# Patient Record
Sex: MALE | Race: WHITE | Employment: FULL TIME | URBAN - METROPOLITAN AREA
[De-identification: names, ages, dates, MRNs, and addresses within clinical notes are randomized per-mention and may not be internally consistent; named-entity substitution may affect disease eponyms.]

---

## 2018-07-31 ENCOUNTER — HOSPITAL ENCOUNTER (OUTPATIENT)
Dept: PHYSICAL THERAPY | Age: 48
Discharge: HOME | End: 2018-07-31
Payer: COMMERCIAL

## 2018-07-31 DIAGNOSIS — M54.5 LOW BACK PAIN, UNSPECIFIED BACK PAIN LATERALITY, UNSPECIFIED CHRONICITY, WITH SCIATICA PRESENCE UNSPECIFIED: ICD-10-CM

## 2018-07-31 DIAGNOSIS — M54.6 THORACIC BACK PAIN, UNSPECIFIED BACK PAIN LATERALITY, UNSPECIFIED CHRONICITY: ICD-10-CM

## 2018-07-31 PROCEDURE — 97110 THERAPEUTIC EXERCISES: CPT

## 2018-07-31 PROCEDURE — 97161 PT EVAL LOW COMPLEX 20 MIN: CPT

## 2018-07-31 NOTE — THERAPY EVALUATION
Baron Daley  : 1970  Primary: [employer]*  Secondary:  [address]  at [address], [address]  [phone]   [id number]       OUTPATIENT PHYSICAL THERAPY:Initial Assessment and Daily Note 2018   ICD-10: Treatment Diagnosis: Low back Pain (M54.4); sprain of ligaments of the lumbar spine, sequela (S34.21XS)  Precautions/Allergies:   Review of patient's allergies indicates no known allergies. Fall Risk Score: 1 (? 5 = High Risk)  MD Orders: Eval and treat MEDICAL/REFERRING DIAGNOSIS:  Low back pain, unspecified back pain laterality, unspecified chronicity, with sciatica presence unspecified [M54.5]  Thoracic back pain, unspecified back pain laterality, unspecified chronicity [M54.6]   DATE OF ONSET: 2018  REFERRING PHYSICIAN: Catrachito Fernández MD  RETURN PHYSICIAN APPOINTMENT: As needed     INITIAL ASSESSMENT:  Mr. Zapata presents motion dysfunction with concurrent myofascial restrictions through lower lumbar spine with postural dysfunction and alignment issue through spine with associated lack of spinal stability, specifically with back extensors and gluteals limiting ability to perform recreational activity and ADLs without episodic onset of back symptoms    PROBLEM LIST (Impacting functional limitations):   Increased pain through low back limiting tolerance of recreational activity (tennis/working out)  Decreased activity tolerance for instrumental ADLs  Decreased ADL/functional activities specificially with bending/lifting/twisting due to episodic LBP  Outcome measure score of 17/50 on Modified Oswestry with moderate effect of low back on patient's ability to manage every day life activities  Decreased strength through back stabilizers limiting tolerance with flexion activities and inefficient postural alignment  Decreased flexibility/mobility/ROM through hip flexors limiting spinal mobility INTERVENTIONS PLANNED:  Patient education including pathophysiology of low back pain  Back education & training (sleeping, sitting and standing positions, posture re-education and body mechanics)  ROM/mobility of hip flexors  Manual therapy including soft tissue mobilizations, functional joint mobilizations and neuromuscular re-education to lumbopelvic region  Neuromuscular re-education with focus on initiation/strength and endurance of back extensors and gluts and motor control of spinal stabilizers with dynamic flexion and rotation activities  Therapeutic exercises including spinal stabilization and hip flexibility  Instructions of home exercise program (HEP)   TREATMENT PLAN:  Effective Dates: 7/31/2018 TO 10/23/18. Frequency/Duration: 1 time a week for 4 weeks followed by 1 time a month for 8 weeks  GOALS: (Goals have been discussed and agreed upon with patient.)  Short-Term Functional Goals: Time Frame: 2 weeks  1. Patient independent with HEP and improvement with static alignment and hip flexor flexibility present  Discharge Goals: Time Frame: 12 weeks  1. Patient independent with a functional spinal stabilization program with no recurrence of LBP. 2. Patient able to return to full recreational activity without recurrence of LBP  3. Outcome measure score of less than 8/50 on Modified Oswestry with very minimal effect of low back on patient's ability to manage every day life activities  Rehabilitation Potential For Stated Goals: Excellent            The information in this section was collected on 7/31/2018 (except where otherwise noted). HISTORY:   History of Present Injury/Illness (Reason for Referral):  Patient reports episodic back pain since initial back injury over 20 years ago playing basketball. He reports recurrence every 1-2 x/year and will necessitate him seeing his PCP for Tordol and Medrol dose pack to treat inflammation and will return to asymptomatic after a few weeks.  Reports the injuries that occur to onset his back pain are often very simple flexion/rotation tasks and at the end of a tennis game. Saw PCP for back problem and recommended physical therapy to conservatively manage symptoms. Goals for therapy include relief of back pain and tools/knowledge to fix the problem rather than treat the symptoms  Past Medical History/Comorbidities:   Mr. Zapata  has a past medical history of Allergic rhinitis; Benign neoplasm of colon; Impotence of organic origin; Mixed hyperlipidemia; testicular hypofunction; Reflux esophagitis; and Vitamin D deficiency. Mr. Zapata  has a past surgical history that includes hx vasectomy (2011) and hx shoulder arthroscopy (Right, 09/2006). Social History/Living Environment:     Patient lives with his family. Patient denies and physical barriers at home. Prior Level of Function/Work/Activity:  Patient works full time as a  with minimal work requirements including prolonged sitting. Patient reports a high physical activity level exercising at [employer] for 60 minutes 5-6x/week. Dominant Side:         RIGHT  Current Medications:       Current Outpatient Prescriptions:     methylPREDNISolone (MEDROL, NATHALIE,) 4 mg tablet, Take 1 Tab by mouth Specific Days and Specific Times. , Disp: 1 Dose Pack, Rfl: 0    omeprazole (PRILOSEC) 40 mg capsule, Take 1 Cap by mouth daily. , Disp: 30 Cap, Rfl: 5    fexofenadine (ALLEGRA) 180 mg tablet, Take  by mouth., Disp: , Rfl:     fluticasone (FLONASE) 50 mcg/actuation nasal spray, 1 [place] by Both Nostrils route daily. , Disp: 1 Bottle, Rfl: 5    ranitidine (ZANTAC) 150 mg tablet, Take 150 mg by mouth two (2) times a day.  Patient instructed to take morning of surgery per anesthesia guidelines, Disp: , Rfl:    Date Last Reviewed:  7/31/2018   Number of Personal Factors/Comorbidities that affect the Plan of Care: 0: LOW COMPLEXITY   EXAMINATION:   Observation/Orthostatic Postural Assessment: Patient exhibits an accentuated lumbar lordosis and thoracic kyphosis with a type I curve convex R with a right rotated position of ribcage and upper lumbar spine. Costal cage is positioned posterior and rotated anterior  over a anterior pelvic inclination. Lower extremity weight bearing is symmetrical with accentuated B LE external rotation in standing. Shoulder symmetry exhibits R depressed and asymmetry of R latt to L. Observation of gait indicates no deficits (patient is a leg walker versus an efficient trunk walker). Lower quadrant bony landmarks are symmtrical. Vertical compression test (VCT) for alignment is 3/5 and anterior shear and L side bending and lower lumbar region concurring with Type I curve noted above L5. Soft tissue observation indicates atrophy through B gluteals and lower lumbar region with constrictions present in same area. .  Palpation: Myofascial assessment indicates increased tone through B hip flexors, worse on the R. Muscle length testing in modified Khalif position exhibits -30 R iliopsoas from neutral and -10 on L; rectus femoris is 60 on L and 80 on R; tensor fascia tono is -30 on R and -10 on L. Hamstring length tested supine with straight leg raise (SLR) is 70 degrees and WNL. Piriformis length is minimally tight B. Quadriceps flexibility tested prone with heel to buttock is to buttock. Marshell Care test is (+) on L  for rectus femoris tightness. Gastrocnemius and soleus flexibility are WNL. ROM: Passive trunk rotation is 100% available to the R and 75% available to the L with a hard end feel at lower lumbar. Kinetic testing in standing including forward bend test is (-). Marcher's test is (-). Leg swing for innominate mobility indicates decreased extension B, worse on the R. Pelvic shear test is standing exhibits decreased excursion of R shear with a hard end feel. Single plane active movements through lumbar spine in standing exhibit 50% loss of flexion with ratio of spine:hamstrings 30:70. . Passive ROM through lumbopelvic flexion is hypomobile through hip, innominate and sacrum B. Lumbar positional testing at transverse processes indicates minor motion loss of B flexion at L5 and L4. Sacral positional testing indicates symmetrical. Seated forward flexion test is (-). Prone knee active flexion test is (-). Spring testing of lumbar spine exhibits hardest end feels at L2/1. Spring testing of sacrum exhibits hard rico feels at B bases. Spring testing of innominates exhibits hardest endfeel R extension. AROM (PROM) (* - denotes pain) Right Left   Hip flexion/Innominate flexion 100/110 100/110   Hip extension/Innominate extension -10/-10 -10/-5   Hip external rotation (ER) 40 40   Hip internal rotation (IR) 35 35   Hip abduction 30 30   Hip adduction 20 25   Strength: Lumbar protective mechanism (LPM) & Automatic Core Engagement (ACE):   Strength: */5; Engagement scored as present/absent/sluggish R diagonal L diagonal   LPM - Flexion Present; 5 Present; 5   LPM - Extension Present;; 1 (immediate R rotation noted) Present; 3     Manual Muscle Test (*/5) Right Left   Knee extension 5 5   Knee flexion 5 5   Hip flexion 5 5   Hip ER 5 5   Hip IR 4+ 4+   Hip extension 4+ 4+   Hip abduction 4+ 4+   Hip adduction 5 5   Ankle DF 5 5   Ankle PF 5 5   Special Tests:     Functional squat (LE clearance): WNL   Soo test: (+) R 6\" from Bed  [place] test is (-) B  Neurological Screen:  Myotomes: Key muscle strength testing through bilateral LE is good. Dermatomes: Sensation testing through bilateral lower quadrants for light touch is intact. Reflexes: L3/4-Patellar; L5-Extensor digitorum brevis; S1-Achilles - 2+ and WNL. Neural tension tests: Passive straight leg raise(SLR)/Lasègues test - (-); crossed SLR test - (-)  Functional Mobility:  Independent   Balance: Age appropriate for sitting and standing both statically and dynamically. Mental Status: Alert and oriented to person, time and place. Body Structures Involved:  1.  Thoracic Cage  2. Joints  3. Muscles  4. Ligaments Body Functions Affected:  1. Sensory/Pain  2. Neuromusculoskeletal  3. Movement Related Activities and Participation Affected:  1. Mobility  2. Self Care  3. Community, Social and [place]   Number of elements (examined above) that affect the Plan of Care: 4+: HIGH COMPLEXITY   CLINICAL PRESENTATION:   Presentation: Stable and uncomplicated: LOW COMPLEXITY   CLINICAL DECISION MAKING:   Outcome Measure: Tool Used: Modified Oswestry Low Back Pain Questionnaire  Score:  Initial: 17/50 (07/31/18)  Most Recent: X/50 (Date: -- )   Interpretation of Score: Each section is scored on a 0-5 scale, 5 representing the greatest disability. The scores of each section are added together for a total score of 50. Medical Necessity:   · Patient is expected to demonstrate progress in strength, range of motion and spinal stability to return to sport and ADLs without recurrence of LBP. Reason for Services/Other Comments:  · Patient continues to require skilled intervention due to  motion dysfunction with concurrent myofascial restrictions through lower lumbar spine with postural dysfunction and alignment issue through spine with associated lack of spinal stability, specifically with back extensors and gluteals limiting ability to perform recreational activity and ADLs without episodic onset of back symptoms. Use of outcome tool(s) and clinical judgement create a POC that gives a: Clear prediction of patient's progress: LOW COMPLEXITY        TREATMENT:   (In addition to Assessment/Re-Assessment sessions the following treatments were rendered)  Pre-treatment Symptoms/Complaints: He reports central LBP, more on the L side. He reports since Tordol injection and steroid pack that concluded 1 1/2 weeks ago, his symptoms are mostly tightness in his low back and he has not resumed sport/gym due to fear of re injuring his back. Patient denies any LE pain, paresthesia or symptoms.  Patient denies any increase of symptoms with cough, sneeze or valsalva. Patient denies any saddle paresthesia or bowel/bladder deficits. Pain: Initial:   Pain Intensity 1: 6  Pain Location 1: Spine, lumbar  Pain Orientation 1: Left, Right  Pain Intervention(s) 1: Medication (see MAR), Rest  Post Session:  0/10   Therapeutic Exercise: (15 Minutes):  Exercises per grid below to improve strength, endurance, range of motion, flexibility. Required minimal visual and verbal cues to promote proper body alignment. Date:  07/31/18 Date:     Exercise     1/2 kneeling iliopsoas strength on R to increase spinal mobility 5 min    Kasi chair to improve gluteal strengthening/stability of spine 5 min    Quadriped spinal extensor stability with LE/UE elongation to improve spinal stability 5 min    Home Exercise Program (HEP): as above; handouts given to patient for all exercises. Treatment/Session Assessment:    · Response to Treatment:  Verbalized understanding and returned demo to all exercises. · Compliance with Program/Exercises: Will assess as treatment progresses.   · Recommendations/Intent for next treatment session: Progress mobility lower lumbar spine and pelvis and spinal stability and re-introduce to gym program  Total Treatment Duration:  PT Patient Time In/Time Out  Time In: 1515  Time Out: [address] [facility], PT

## 2018-07-31 NOTE — PROGRESS NOTES
Sabi Morelos  : 1970 [facility] at [facility]  [address] [place] [address]  [phone]   [phone]   OUTPATIENT ORTHOPAEDIC PHYSICAL THERAPY    NAME/AGE/GENDER: Sabi Morelos is a 53 y.o. male. DATE: 2018                         Ambulatory/Rehab Services H2 Model Falls Risk Assessment    Risk Factor Pts. ·   Confusion/Disorientation/Impulsivity  []      4 ·   Symptomatic Depression  []     2 ·   Altered Elimination  []     1 ·   Dizziness/Vertigo  []     1 ·   Gender (Male)  [x]     1 ·   Any administered antiepileptics (anticonvulsants):  []     2 ·   Any administered benzodiazepines:  []     1 ·   Visual Impairment (specify):  []     1 ·   Portable Oxygen Use  []     1 ·   Orthostatic ? BP  []     1 ·   History of Recent Falls (within 3 mos.)  []     5     Ability to Rise from Chair (choose one) Pts. ·   Ability to rise in a single movement  [x]     0 ·   Pushes up, successful in one attempt  []     1 ·   Multiple attempts, but unsuccessful  []     3 ·   Unable to rise without assistance  []     4   Total: (5 or greater = High Risk) 1     Falls Prevention Plan:   []                  Physical Limitations to Exercise (specify):   []                  Mobility Assistance Device (type):   []                  Exercise/Equipment Adaptation (specify):    © [employer] [address] [place] [address] Patent #[id number].  Federal Law prohibits the replication, distribution or use without written permission from [employer], [employer], Madhav Owusu CFMT

## 2018-08-03 ENCOUNTER — HOSPITAL ENCOUNTER (OUTPATIENT)
Dept: CT IMAGING | Age: 48
Discharge: HOME | End: 2018-08-03
Attending: FAMILY MEDICINE
Payer: COMMERCIAL

## 2018-08-03 DIAGNOSIS — R10.11 RUQ ABDOMINAL PAIN: ICD-10-CM

## 2018-08-03 PROCEDURE — 74011636320 HC RX REV CODE- 636/320: Performed by: FAMILY MEDICINE

## 2018-08-03 PROCEDURE — 74011000258 HC RX REV CODE- 258: Performed by: FAMILY MEDICINE

## 2018-08-03 PROCEDURE — 74177 CT ABD & PELVIS W/CONTRAST: CPT

## 2018-08-03 RX ORDER — SODIUM CHLORIDE 0.9 % (FLUSH) 0.9 %
10 SYRINGE (ML) INJECTION
Status: COMPLETED | OUTPATIENT
Start: 2018-08-03 | End: 2018-08-03

## 2018-08-03 RX ADMIN — IOPAMIDOL 100 ML: 755 INJECTION, SOLUTION INTRAVENOUS at 14:15

## 2018-08-03 RX ADMIN — DIATRIZOATE MEGLUMINE AND DIATRIZOATE SODIUM 15 ML: 660; 100 LIQUID ORAL; RECTAL at 14:15

## 2018-08-03 RX ADMIN — Medication 10 ML: at 14:15

## 2018-08-03 RX ADMIN — SODIUM CHLORIDE 100 ML: 900 INJECTION, SOLUTION INTRAVENOUS at 14:15

## 2018-08-08 ENCOUNTER — HOSPITAL ENCOUNTER (OUTPATIENT)
Dept: PHYSICAL THERAPY | Age: 48
Discharge: HOME | End: 2018-08-08
Payer: COMMERCIAL

## 2018-08-08 PROCEDURE — 97110 THERAPEUTIC EXERCISES: CPT

## 2018-08-08 NOTE — PROGRESS NOTES
[place]  : 1970  Primary: [employer]  Secondary:  [address] at [address]  [phone]   [phone]       OUTPATIENT PHYSICAL THERAPY:Daily Note 2018   ICD-10: Treatment Diagnosis: Low back Pain (M54.4); sprain of ligaments of the lumbar spine, sequela (S34.21XS)  Precautions/Allergies:   Review of patient's allergies indicates no known allergies. Fall Risk Score: 1 (? 5 = High Risk)  MD Orders: Eval and treat MEDICAL/REFERRING DIAGNOSIS:  Low back pain [M54.5]   DATE OF ONSET: 2018  REFERRING PHYSICIAN: Enrico Carreno MD  RETURN PHYSICIAN APPOINTMENT: As needed     INITIAL ASSESSMENT:  Mr. Zapata presents motion dysfunction with concurrent myofascial restrictions through lower lumbar spine with postural dysfunction and alignment issue through spine with associated lack of spinal stability, specifically with back extensors and gluteals limiting ability to perform recreational activity and ADLs without episodic onset of back symptoms    PROBLEM LIST (Impacting functional limitations):   Increased pain through low back limiting tolerance of recreational activity (tennis/working out)  Decreased activity tolerance for instrumental ADLs  Decreased ADL/functional activities specificially with bending/lifting/twisting due to episodic LBP  Outcome measure score of 17/50 on Modified Oswestry with moderate effect of low back on patient's ability to manage every day life activities  Decreased strength through back stabilizers limiting tolerance with flexion activities and inefficient postural alignment  Decreased flexibility/mobility/ROM through hip flexors limiting spinal mobility INTERVENTIONS PLANNED:  Patient education including pathophysiology of low back pain  Back education & training (sleeping, sitting and standing positions, posture re-education and body mechanics)  ROM/mobility of hip flexors  Manual therapy including soft tissue mobilizations, functional joint mobilizations and neuromuscular re-education to lumbopelvic region  Neuromuscular re-education with focus on initiation/strength and endurance of back extensors and gluts and motor control of spinal stabilizers with dynamic flexion and rotation activities  Therapeutic exercises including spinal stabilization and hip flexibility  Instructions of home exercise program (HEP)   TREATMENT PLAN:  Effective Dates: 7/31/2018 TO 10/23/18. Frequency/Duration: 1 time a week for 3 weeks followed by 1 time a month for 8 weeks  GOALS: (Goals have been discussed and agreed upon with patient.)  Short-Term Functional Goals: Time Frame: 2 weeks  1. Patient independent with HEP and improvement with static alignment and hip flexor flexibility present  Discharge Goals: Time Frame: 12 weeks  1. Patient independent with a functional spinal stabilization program with no recurrence of LBP. 2. Patient able to return to full recreational activity without recurrence of LBP  3. Outcome measure score of less than 8/50 on Modified Oswestry with very minimal effect of low back on patient's ability to manage every day life activities  Rehabilitation Potential For Stated Goals: Excellent            The information in this section was collected on 8/8/2018 (except where otherwise noted). HISTORY:   History of Present Injury/Illness (Reason for Referral):  Patient reports episodic back pain since initial back injury over 20 years ago playing basketball. He reports recurrence every 1-2 x/year and will necessitate him seeing his PCP for Tordol and Medrol dose pack to treat inflammation and will return to asymptomatic after a few weeks. Reports the injuries that occur to onset his back pain are often very simple flexion/rotation tasks and at the end of a tennis game. Saw PCP for back problem and recommended physical therapy to conservatively manage symptoms.  Goals for therapy include relief of back pain and tools/knowledge to fix the problem rather than treat the symptoms  Past Medical History/Comorbidities:   Mr. Zapata  has a past medical history of Allergic rhinitis; Benign neoplasm of colon; Impotence of organic origin; Mixed hyperlipidemia; testicular hypofunction; Reflux esophagitis; and Vitamin D deficiency. Mr. Zapata  has a past surgical history that includes hx vasectomy (2011) and hx shoulder arthroscopy (Right, 09/2006). Social History/Living Environment:     Patient lives with his family. Patient denies and physical barriers at home. Prior Level of Function/Work/Activity:  Patient works full time as a  with minimal work requirements including prolonged sitting. Patient reports a high physical activity level exercising at [employer] for 60 minutes 5-6x/week. Dominant Side:         RIGHT  Current Medications:       Current Outpatient Prescriptions:     methylPREDNISolone (MEDROL, NATHALIE,) 4 mg tablet, Take 1 Tab by mouth Specific Days and Specific Times. , Disp: 1 Dose Pack, Rfl: 0    omeprazole (PRILOSEC) 40 mg capsule, Take 1 Cap by mouth daily. , Disp: 30 Cap, Rfl: 5    fexofenadine (ALLEGRA) 180 mg tablet, Take  by mouth., Disp: , Rfl:     fluticasone (FLONASE) 50 mcg/actuation nasal spray, 1 [place] by Both Nostrils route daily. , Disp: 1 Bottle, Rfl: 5    ranitidine (ZANTAC) 150 mg tablet, Take 150 mg by mouth two (2) times a day. Patient instructed to take morning of surgery per anesthesia guidelines, Disp: , Rfl:    Date Last Reviewed:  8/8/2018   EXAMINATION:   Observation/Orthostatic Postural Assessment: Patient exhibits an accentuated lumbar lordosis and thoracic kyphosis with a type I curve convex R with a right rotated position of ribcage and upper lumbar spine. Costal cage is positioned posterior and rotated anterior  over a anterior pelvic inclination.  Lower extremity weight bearing is symmetrical with accentuated B LE external rotation in standing. Shoulder symmetry exhibits R depressed and asymmetry of R latt to L. Observation of gait indicates no deficits (patient is a leg walker versus an efficient trunk walker). Lower quadrant bony landmarks are symmtrical. Vertical compression test (VCT) for alignment is 3/5 and anterior shear and L side bending and lower lumbar region concurring with Type I curve noted above L5. Soft tissue observation indicates atrophy through B gluteals and lower lumbar region with constrictions present in same area. .  Palpation: Myofascial assessment indicates increased tone through B hip flexors, worse on the R. Muscle length testing in modified Khalif position exhibits -30 R iliopsoas from neutral and -10 on L; rectus femoris is 60 on L and 80 on R; tensor fascia tono is -30 on R and -10 on L. Hamstring length tested supine with straight leg raise (SLR) is 70 degrees and WNL. Piriformis length is minimally tight B. Quadriceps flexibility tested prone with heel to buttock is to buttock. Mati Girt test is (+) on L  for rectus femoris tightness. Gastrocnemius and soleus flexibility are WNL. ROM: Passive trunk rotation is 100% available to the R and 75% available to the L with a hard end feel at lower lumbar. Kinetic testing in standing including forward bend test is (-). Marcher's test is (-). Leg swing for innominate mobility indicates decreased extension B, worse on the R. Pelvic shear test is standing exhibits decreased excursion of R shear with a hard end feel. Single plane active movements through lumbar spine in standing exhibit 50% loss of flexion with ratio of spine:hamstrings 30:70. . Passive ROM through lumbopelvic flexion is hypomobile through hip, innominate and sacrum B. Lumbar positional testing at transverse processes indicates minor motion loss of B flexion at L5 and L4. Sacral positional testing indicates symmetrical. Seated forward flexion test is (-). Prone knee active flexion test is (-).  Spring testing of lumbar spine exhibits hardest end feels at L2/1. Spring testing of sacrum exhibits hard rico feels at B bases. Spring testing of innominates exhibits hardest endfeel R extension. AROM (PROM) (* - denotes pain) Right Left   Hip flexion/Innominate flexion 100/110 100/110   Hip extension/Innominate extension -10/-10 -10/-5   Hip external rotation (ER) 40 40   Hip internal rotation (IR) 35 35   Hip abduction 30 30   Hip adduction 20 25   Strength: Lumbar protective mechanism (LPM) & Automatic Core Engagement (ACE):   Strength: */5; Engagement scored as present/absent/sluggish R diagonal L diagonal   LPM - Flexion Present; 5 Present; 5   LPM - Extension Present;; 1 (immediate R rotation noted) Present; 3     Manual Muscle Test (*/5) Right Left   Knee extension 5 5   Knee flexion 5 5   Hip flexion 5 5   Hip ER 5 5   Hip IR 4+ 4+   Hip extension 4+ 4+   Hip abduction 4+ 4+   Hip adduction 5 5   Ankle DF 5 5   Ankle PF 5 5   Special Tests:     Functional squat (LE clearance): WNL   Soo test: (+) R 6\" from Bed  Baldwin Pain test is (-) B  Neurological Screen:  Myotomes: Key muscle strength testing through bilateral LE is good. Dermatomes: Sensation testing through bilateral lower quadrants for light touch is intact. Reflexes: L3/4-Patellar; L5-Extensor digitorum brevis; S1-Achilles - 2+ and WNL. Neural tension tests: Passive straight leg raise(SLR)/Lasègues test - (-); crossed SLR test - (-)  Functional Mobility:  Independent   Balance: Age appropriate for sitting and standing both statically and dynamically. Mental Status: Alert and oriented to person, time and place. CLINICAL DECISION MAKING:   Outcome Measure: Tool Used: Modified Oswestry Low Back Pain Questionnaire  Score:  Initial: 17/50 (07/31/18)  Most Recent: X/50 (Date: -- )   Interpretation of Score: Each section is scored on a 0-5 scale, 5 representing the greatest disability.   The scores of each section are added together for a total score of 50.    Medical Necessity:   · Patient is expected to demonstrate progress in strength, range of motion and spinal stability to return to sport and ADLs without recurrence of LBP. Reason for Services/Other Comments:  · Patient continues to require skilled intervention due to  motion dysfunction with concurrent myofascial restrictions through lower lumbar spine with postural dysfunction and alignment issue through spine with associated lack of spinal stability, specifically with back extensors and gluteals limiting ability to perform recreational activity and ADLs without episodic onset of back symptoms. TREATMENT:   (In addition to Assessment/Re-Assessment sessions the following treatments were rendered)  Pre-treatment Symptoms/Complaints: He reports he has been doing his exercises and already notices an improvement in how his low back feels. He reports central LBP, more on the L side and his symptoms are mostly tightness in his low back and he has not resumed sport/gym due to fear of re injuring his back. Pain: Initial:   Pain Intensity 1: 2  Pain Location 1: Spine, lumbar  Post Session:  0/10   Therapeutic Exercise: (45 Minutes):  Exercises per grid below to improve strength, endurance, range of motion, flexibility. Required minimal visual and verbal cues to promote proper body alignment. Continues to require constant cuing for bracing for any activity. Date:  07/31/18 Date:  08/08/18   Exercise     Review of gym exercises performing - tricep pull, latt pull, free weights, leg press, hamstring curl  30 min   1/2 kneeling iliopsoas strength on R to increase spinal mobility 5 min 5 min   Kasi chair to improve gluteal strengthening/stability of spine 5 min 5 min   Quadriped spinal extensor stability with LE/UE elongation to improve spinal stability 5 min 5 min   Home Exercise Program (HEP): as above; handouts given to patient for all exercises.   Treatment/Session Assessment:    · Response to Treatment: Verbalized understanding and returned demo to all exercises including gym exercises. .  · Compliance with Program/Exercises: Compliant  · Recommendations/Intent for next treatment session: Progress mobility lower lumbar spine and pelvis and spinal stability and continue to re-introduce to gym program.  Total Treatment Duration:  PT Patient Time In/Time Out  Time In: 1615  Time Out: 1700    Merced Malloy PT

## 2018-08-14 ENCOUNTER — APPOINTMENT (OUTPATIENT)
Dept: PHYSICAL THERAPY | Age: 48
End: 2018-08-14
Payer: COMMERCIAL

## 2018-08-16 ENCOUNTER — HOSPITAL ENCOUNTER (OUTPATIENT)
Dept: LAB | Age: 48
Discharge: HOME | End: 2018-08-16

## 2018-08-16 PROCEDURE — 88312 SPECIAL STAINS GROUP 1: CPT

## 2018-08-16 PROCEDURE — 88305 TISSUE EXAM BY PATHOLOGIST: CPT

## 2018-08-22 ENCOUNTER — HOSPITAL ENCOUNTER (OUTPATIENT)
Dept: PHYSICAL THERAPY | Age: 48
Discharge: HOME | End: 2018-08-22
Payer: COMMERCIAL

## 2018-08-22 PROCEDURE — 97112 NEUROMUSCULAR REEDUCATION: CPT

## 2018-08-22 PROCEDURE — 97110 THERAPEUTIC EXERCISES: CPT

## 2018-08-22 NOTE — PROGRESS NOTES
Katie Augustine  : 1970  Primary: [employer]  Secondary:  [address] at [address]  [phone]   [phone]       OUTPATIENT PHYSICAL THERAPY:Daily Note 2018   ICD-10: Treatment Diagnosis: Low back Pain (M54.4); sprain of ligaments of the lumbar spine, sequela (S34.21XS)  Precautions/Allergies:   Review of patient's allergies indicates no known allergies. Fall Risk Score: 1 (? 5 = High Risk)  MD Orders: Eval and treat MEDICAL/REFERRING DIAGNOSIS:  Low back pain [M54.5]   DATE OF ONSET: 2018  REFERRING PHYSICIAN: Rebecca Garza MD  RETURN PHYSICIAN APPOINTMENT: As needed     INITIAL ASSESSMENT:  Mr. Zapata presents motion dysfunction with concurrent myofascial restrictions through lower lumbar spine with postural dysfunction and alignment issue through spine with associated lack of spinal stability, specifically with back extensors and gluteals limiting ability to perform recreational activity and ADLs without episodic onset of back symptoms    PROBLEM LIST (Impacting functional limitations):   Increased pain through low back limiting tolerance of recreational activity (tennis/working out)  Decreased activity tolerance for instrumental ADLs  Decreased ADL/functional activities specificially with bending/lifting/twisting due to episodic LBP  Outcome measure score of 17/50 on Modified Oswestry with moderate effect of low back on patient's ability to manage every day life activities  Decreased strength through back stabilizers limiting tolerance with flexion activities and inefficient postural alignment  Decreased flexibility/mobility/ROM through hip flexors limiting spinal mobility INTERVENTIONS PLANNED:  Patient education including pathophysiology of low back pain  Back education & training (sleeping, sitting and standing positions, posture re-education and body mechanics)  ROM/mobility of hip flexors  Manual therapy including soft tissue mobilizations, functional joint mobilizations and neuromuscular re-education to lumbopelvic region  Neuromuscular re-education with focus on initiation/strength and endurance of back extensors and gluts and motor control of spinal stabilizers with dynamic flexion and rotation activities  Therapeutic exercises including spinal stabilization and hip flexibility  Instructions of home exercise program (HEP)   TREATMENT PLAN:  Effective Dates: 7/31/2018 TO 10/23/18. Frequency/Duration: 1 time a week for 3 weeks followed by 1 time a month for 8 weeks  GOALS: (Goals have been discussed and agreed upon with patient.)  Short-Term Functional Goals: Time Frame: 2 weeks  1. Patient independent with HEP and improvement with static alignment and hip flexor flexibility present  Discharge Goals: Time Frame: 12 weeks  1. Patient independent with a functional spinal stabilization program with no recurrence of LBP. 2. Patient able to return to full recreational activity without recurrence of LBP  3. Outcome measure score of less than 8/50 on Modified Oswestry with very minimal effect of low back on patient's ability to manage every day life activities  Rehabilitation Potential For Stated Goals: Excellent            The information in this section was collected on 8/22/2018 (except where otherwise noted). HISTORY:   History of Present Injury/Illness (Reason for Referral):  Patient reports episodic back pain since initial back injury over 20 years ago playing basketball. He reports recurrence every 1-2 x/year and will necessitate him seeing his PCP for Tordol and Medrol dose pack to treat inflammation and will return to asymptomatic after a few weeks. Reports the injuries that occur to onset his back pain are often very simple flexion/rotation tasks and at the end of a tennis game. Saw PCP for back problem and recommended physical therapy to conservatively manage symptoms.  Goals for therapy include relief of back pain and tools/knowledge to fix the problem rather than treat the symptoms  Past Medical History/Comorbidities:   Mr. Zapata  has a past medical history of Allergic rhinitis; Benign neoplasm of colon; Impotence of organic origin; Mixed hyperlipidemia; testicular hypofunction; Reflux esophagitis; and Vitamin D deficiency. Mr. Zapata  has a past surgical history that includes hx vasectomy (2011) and hx shoulder arthroscopy (Right, 09/2006). Social History/Living Environment:     Patient lives with his family. Patient denies and physical barriers at home. Prior Level of Function/Work/Activity:  Patient works full time as a  with minimal work requirements including prolonged sitting. Patient reports a high physical activity level exercising at [employer] for 60 minutes 5-6x/week. Dominant Side:         RIGHT  Current Medications:       Current Outpatient Prescriptions:     methylPREDNISolone (MEDROL, NATHALIE,) 4 mg tablet, Take 1 Tab by mouth Specific Days and Specific Times. , Disp: 1 Dose Pack, Rfl: 0    omeprazole (PRILOSEC) 40 mg capsule, Take 1 Cap by mouth daily. , Disp: 30 Cap, Rfl: 5    fexofenadine (ALLEGRA) 180 mg tablet, Take  by mouth., Disp: , Rfl:     fluticasone (FLONASE) 50 mcg/actuation nasal spray, 1 [place] by Both Nostrils route daily. , Disp: 1 Bottle, Rfl: 5    ranitidine (ZANTAC) 150 mg tablet, Take 150 mg by mouth two (2) times a day. Patient instructed to take morning of surgery per anesthesia guidelines, Disp: , Rfl:    Date Last Reviewed:  8/22/2018   EXAMINATION:   Observation/Orthostatic Postural Assessment: Patient exhibits an accentuated lumbar lordosis and thoracic kyphosis with a type I curve convex R with a right rotated position of ribcage and upper lumbar spine. Costal cage is positioned posterior and rotated anterior  over a anterior pelvic inclination.  Lower extremity weight bearing is symmetrical with accentuated B LE external rotation in standing. Shoulder symmetry exhibits R depressed and asymmetry of R latt to L. Observation of gait indicates no deficits (patient is a leg walker versus an efficient trunk walker). Lower quadrant bony landmarks are symmtrical. Vertical compression test (VCT) for alignment is 3/5 and anterior shear and L side bending and lower lumbar region concurring with Type I curve noted above L5. Soft tissue observation indicates atrophy through B gluteals and lower lumbar region with constrictions present in same area. .  Palpation: Myofascial assessment indicates increased tone through B hip flexors, worse on the R. Muscle length testing in modified Khalif position exhibits -30 R iliopsoas from neutral and -10 on L; rectus femoris is 60 on L and 80 on R; tensor fascia tono is -30 on R and -10 on L. Hamstring length tested supine with straight leg raise (SLR) is 70 degrees and WNL. Piriformis length is minimally tight B. Quadriceps flexibility tested prone with heel to buttock is to buttock. Noy Harder test is (+) on L  for rectus femoris tightness. Gastrocnemius and soleus flexibility are WNL. ROM: Passive trunk rotation is 100% available to the R and 75% available to the L with a hard end feel at lower lumbar. Kinetic testing in standing including forward bend test is (-). Marcher's test is (-). Leg swing for innominate mobility indicates symmetrical and significantly improved. Pelvic shear test is standing exhibits decreased excursion of R shear with a hard end feel. Single plane active movements through lumbar spine in standing exhibit 50% loss of flexion with ratio of spine:hamstrings 30:70. Passive ROM through lumbopelvic flexion is hypomobile through hip, innominate and sacrum B. Lumbar positional testing at transverse processes indicates end range motion loss of B flexion at L5 and L4. Sacral positional testing indicates symmetrical. Seated forward flexion test is (-). Prone knee active flexion test is (-).  Spring testing of lumbar spine exhibits hardest end feels at L2/1. Spring testing of sacrum exhibits hard rico feels at B bases. Spring testing of innominates exhibits hardest endfeel R extension. AROM (PROM) (* - denotes pain) Right Left   Hip flexion/Innominate flexion 100/110 100/110   Hip extension/Innominate extension -10/-10 -10/-5   Hip external rotation (ER) 40 40   Hip internal rotation (IR) 35 35   Hip abduction 30 30   Hip adduction 20 25   Strength: Lumbar protective mechanism (LPM) & Automatic Core Engagement (ACE):   Strength: */5; Engagement scored as present/absent/sluggish R diagonal L diagonal   LPM - Flexion Present; 5 Present; 5   LPM - Extension Present; 3  Present; 4     Manual Muscle Test (*/5) Right Left   Knee extension 5 5   Knee flexion 5 5   Hip flexion 5 5   Hip ER 5 5   Hip IR 4+ 4+   Hip extension 4+ 4+   Hip abduction 4+ 4+   Hip adduction 5 5   Ankle DF 5 5   Ankle PF 5 5   Special Tests:     Functional squat (LE clearance): WNL   Soo test: (+) R 6\" from Bed  Doron Honey test is (-) B  Neurological Screen:  Myotomes: Key muscle strength testing through bilateral LE is good. Dermatomes: Sensation testing through bilateral lower quadrants for light touch is intact. Reflexes: L3/4-Patellar; L5-Extensor digitorum brevis; S1-Achilles - 2+ and WNL. Neural tension tests: Passive straight leg raise(SLR)/Lasègues test - (-); crossed SLR test - (-)  Functional Mobility:  Independent   Balance: Age appropriate for sitting and standing both statically and dynamically. Mental Status: Alert and oriented to person, time and place. CLINICAL DECISION MAKING:   Outcome Measure: Tool Used: Modified Oswestry Low Back Pain Questionnaire  Score:  Initial: 17/50 (07/31/18)  Most Recent: X/50 (Date: -- )   Interpretation of Score: Each section is scored on a 0-5 scale, 5 representing the greatest disability. The scores of each section are added together for a total score of 50.     Medical Necessity: · Patient is expected to demonstrate progress in strength, range of motion and spinal stability to return to sport and ADLs without recurrence of LBP. Reason for Services/Other Comments:  · Patient continues to require skilled intervention due to  motion dysfunction with concurrent myofascial restrictions through lower lumbar spine with postural dysfunction and alignment issue through spine with associated lack of spinal stability, specifically with back extensors and gluteals limiting ability to perform recreational activity and ADLs without episodic onset of back symptoms. TREATMENT:   (In addition to Assessment/Re-Assessment sessions the following treatments were rendered)  Pre-treatment Symptoms/Complaints: He reports he has been doing his exercises and already notices an improvement in how his low back feels. He reports central LBP, more on the L side and his symptoms are mostly tightness in his low back and he has not resumed sport/gym due to fear of re injuring his back. Pain: Initial:   Pain Intensity 1: 0  Pain Location 1: Spine, lumbar  Post Session:  0/10   Therapeutic Exercise: (30 Minutes):  Exercises per grid below to improve strength, endurance, range of motion, flexibility. Required minimal visual and verbal cues to promote proper body alignment. Discussed return to tennis when he feels ready to include working with a  for form and technique.    Date:  07/31/18 Date:  08/08/18 Date:  08/22/18   Exercise      Review of gym exercises performing - tricep pull, latt pull, free weights, leg press, hamstring curl  30 min -   1/2 kneeling iliopsoas strength on R to increase spinal mobility 5 min 5 min 5 min   Kasi chair to improve gluteal strengthening/stability of spine 5 min 5 min 15 min   Quadriped spinal extensor stability with LE/UE elongation to improve spinal stability 5 min 5 min 10 min; green band for resistance to elongation   Home Exercise Program (HEP): as above; handouts given to patient for all exercises. Neuromuscular Reeducation: (20 minutes):  Exercise/activities per grid below to improve movement, balance, coordination, kinesthetic sense, posture and/or proprioception. Required minimal verbal cues to promote efficient alignment. Date:  08/22/18 Date:     Activity     Standing desk - efficient alignment including foot rest 20 min              Treatment/Session Assessment:    · Response to Treatment:  Verbalized understanding and returned demo to all exercises including gym exercises. .  · Compliance with Program/Exercises: Compliant  · Recommendations/Intent for next treatment session: Progress mobility lower lumbar spine and pelvis and spinal stability and continue to progress gym program and start return to tennis.   Total Treatment Duration:  PT Patient Time In/Time Out  Time In: 0930  Time Out: 1020    Misha Hurley, PT

## 2018-08-29 ENCOUNTER — APPOINTMENT (OUTPATIENT)
Dept: PHYSICAL THERAPY | Age: 48
End: 2018-08-29
Payer: COMMERCIAL

## 2018-09-19 ENCOUNTER — HOSPITAL ENCOUNTER (OUTPATIENT)
Dept: NUCLEAR MEDICINE | Age: 48
Discharge: HOME | End: 2018-09-19
Attending: INTERNAL MEDICINE
Payer: COMMERCIAL

## 2018-09-19 VITALS — WEIGHT: 197 LBS | BODY MASS INDEX: 27.48 KG/M2

## 2018-09-19 DIAGNOSIS — R10.10 UPPER ABDOMINAL PAIN, UNSPECIFIED: ICD-10-CM

## 2018-09-19 PROCEDURE — 74011250636 HC RX REV CODE- 250/636: Performed by: INTERNAL MEDICINE

## 2018-09-19 PROCEDURE — 78227 HEPATOBIL SYST IMAGE W/DRUG: CPT

## 2018-09-19 RX ORDER — SODIUM CHLORIDE 0.9 % (FLUSH) 0.9 %
10 SYRINGE (ML) INJECTION
Status: COMPLETED | OUTPATIENT
Start: 2018-09-19 | End: 2018-09-19

## 2018-09-19 RX ADMIN — Medication 10 ML: at 07:59

## 2018-09-19 RX ADMIN — SINCALIDE 1.79 MCG: 5 INJECTION, POWDER, LYOPHILIZED, FOR SOLUTION INTRAVENOUS at 09:04

## 2018-09-26 ENCOUNTER — HOSPITAL ENCOUNTER (OUTPATIENT)
Dept: PHYSICAL THERAPY | Age: 48
Discharge: HOME | End: 2018-09-26

## 2018-09-26 NOTE — PROGRESS NOTES
Adrienne Mills  : 1970 Therapy Center at [facility] & [address]  [phone]   [phone]      OUTPATIENT ORTHOPAEDIC PHYSICAL THERAPY    DATE: 2018    Patient CANCELLED for appointment today due to another appointment. Will plan to follow up on next scheduled visit.     Janeth hSultz PT, OCS, RYT, CKTP, CFMT

## 2018-10-29 NOTE — PROGRESS NOTES
Jackie Carreno  : 1970  Primary: [employer]  Secondary:  [address] at [address], [address] [place] [address]  [phone]   [phone]       OUTPATIENT PHYSICAL THERAPY:Discontinuation Summary 2018   ICD-10: Treatment Diagnosis: Low back Pain (M54.4); sprain of ligaments of the lumbar spine, sequela (S34.21XS)   MEDICAL/REFERRING DIAGNOSIS:  Low back pain [M54.5]   DATE OF ONSET: 2018  REFERRING PHYSICIAN: Felecia Hull MD   DISCONTINUATION SUMMARY: Patient has now attended 3 out of 4 scheduled physical therapy visits with initial evaluation completed on 18. Mr Zapata cancelled for appointment on 18 and failed to follow-up for any further appointment. Discontinue at this time due to plan of care has . INITIAL ASSESSMENT:  Mr. Zapata presents motion dysfunction with concurrent myofascial restrictions through lower lumbar spine with postural dysfunction and alignment issue through spine with associated lack of spinal stability, specifically with back extensors and gluteals limiting ability to perform recreational activity and ADLs without episodic onset of back symptoms    PLAN: Discontinue current plan of care. Discharge Goals: Time Frame: 12 weeks  1. Patient independent with a functional spinal stabilization program with no recurrence of LBP (goal achieved). 2. Patient able to return to full recreational activity without recurrence of LBP (goal achieved). 3. Outcome measure score of less than 8/50 on Modified Oswestry with very minimal effect of low back on patient's ability to manage every day life activities (goal not reassessed).           Thank you for this referral,  Preet Sainz, PT

## 2022-06-06 DIAGNOSIS — J11.1 FLU: Primary | ICD-10-CM

## 2022-06-06 RX ORDER — OSELTAMIVIR PHOSPHATE 75 MG/1
75 CAPSULE ORAL DAILY
Qty: 10 CAPSULE | Refills: 0 | Status: SHIPPED | OUTPATIENT
Start: 2022-06-06 | End: 2022-06-16

## 2022-11-23 ENCOUNTER — PATIENT MESSAGE (OUTPATIENT)
Dept: FAMILY MEDICINE CLINIC | Facility: CLINIC | Age: 52
End: 2022-11-23

## 2022-11-23 RX ORDER — OSELTAMIVIR PHOSPHATE 75 MG/1
75 CAPSULE ORAL DAILY
Qty: 5 CAPSULE | Refills: 0 | Status: SHIPPED | OUTPATIENT
Start: 2022-11-23 | End: 2022-11-28

## 2022-11-23 NOTE — TELEPHONE ENCOUNTER
From: Laure Horowitz  To: Dr. Guanaco Cr: 11/23/2022 3:42 PM EST  Subject: Tamiflu    [facility] was recently diagnosed with the flu and I am writing to see if I can get a prescription for Tamiflu so that I can start taking it now. She told me she reached out to you and that I needed to request it via [address] Can you please send in a prescription today? Thanks.     Vivian Schaffer

## 2022-12-09 ENCOUNTER — NURSE ONLY (OUTPATIENT)
Dept: FAMILY MEDICINE CLINIC | Facility: CLINIC | Age: 52
End: 2022-12-09
Payer: COMMERCIAL

## 2022-12-09 ENCOUNTER — NURSE ONLY (OUTPATIENT)
Dept: FAMILY MEDICINE CLINIC | Facility: CLINIC | Age: 52
End: 2022-12-09

## 2022-12-09 DIAGNOSIS — Z00.00 ENCOUNTER FOR ANNUAL PHYSICAL EXAM: Primary | ICD-10-CM

## 2022-12-09 DIAGNOSIS — Z12.5 SCREENING PSA (PROSTATE SPECIFIC ANTIGEN): ICD-10-CM

## 2022-12-09 DIAGNOSIS — Z23 NEED FOR TETANUS, DIPHTHERIA, AND ACELLULAR PERTUSSIS (TDAP) VACCINE: Primary | ICD-10-CM

## 2022-12-09 DIAGNOSIS — Z00.00 ENCOUNTER FOR ANNUAL PHYSICAL EXAM: ICD-10-CM

## 2022-12-09 LAB
APPEARANCE UR: CLEAR
BACTERIA URNS QL MICRO: NEGATIVE /HPF
BASOPHILS # BLD: 0 K/UL (ref 0–0.2)
BASOPHILS NFR BLD: 1 % (ref 0–2)
BILIRUB UR QL: NEGATIVE
CASTS URNS QL MICRO: ABNORMAL /LPF (ref 0–2)
CHOLEST SERPL-MCNC: 216 MG/DL
COLOR UR: ABNORMAL
DIFFERENTIAL METHOD BLD: NORMAL
EOSINOPHIL # BLD: 0.1 K/UL (ref 0–0.8)
EOSINOPHIL NFR BLD: 2 % (ref 0.5–7.8)
EPI CELLS #/AREA URNS HPF: ABNORMAL /HPF (ref 0–5)
ERYTHROCYTE [DISTWIDTH] IN BLOOD BY AUTOMATED COUNT: 12.7 % (ref 11.9–14.6)
GLUCOSE UR STRIP.AUTO-MCNC: NEGATIVE MG/DL
HCT VFR BLD AUTO: 47.3 % (ref 41.1–50.3)
HDLC SERPL-MCNC: 49 MG/DL (ref 40–60)
HDLC SERPL: 4.4 {RATIO}
HGB BLD-MCNC: 15.3 G/DL (ref 13.6–17.2)
HGB UR QL STRIP: NEGATIVE
IMM GRANULOCYTES # BLD AUTO: 0 K/UL (ref 0–0.5)
IMM GRANULOCYTES NFR BLD AUTO: 1 % (ref 0–5)
KETONES UR QL STRIP.AUTO: NEGATIVE MG/DL
LDLC SERPL CALC-MCNC: 152.4 MG/DL
LEUKOCYTE ESTERASE UR QL STRIP.AUTO: NEGATIVE
LYMPHOCYTES # BLD: 1.8 K/UL (ref 0.5–4.6)
LYMPHOCYTES NFR BLD: 31 % (ref 13–44)
MCH RBC QN AUTO: 29.5 PG (ref 26.1–32.9)
MCHC RBC AUTO-ENTMCNC: 32.3 G/DL (ref 31.4–35)
MCV RBC AUTO: 91.1 FL (ref 82–102)
MONOCYTES # BLD: 0.6 K/UL (ref 0.1–1.3)
MONOCYTES NFR BLD: 10 % (ref 4–12)
MUCOUS THREADS URNS QL MICRO: 0 /LPF
NEUTS SEG # BLD: 3.2 K/UL (ref 1.7–8.2)
NEUTS SEG NFR BLD: 55 % (ref 43–78)
NITRITE UR QL STRIP.AUTO: NEGATIVE
NRBC # BLD: 0 K/UL (ref 0–0.2)
PH UR STRIP: 5.5 [PH] (ref 5–9)
PLATELET # BLD AUTO: 198 K/UL (ref 150–450)
PMV BLD AUTO: 10.4 FL (ref 9.4–12.3)
PROT UR STRIP-MCNC: NEGATIVE MG/DL
PSA SERPL-MCNC: 0.7 NG/ML
RBC # BLD AUTO: 5.19 M/UL (ref 4.23–5.6)
RBC #/AREA URNS HPF: ABNORMAL /HPF (ref 0–5)
SP GR UR REFRACTOMETRY: 1.02 (ref 1–1.02)
TRIGL SERPL-MCNC: 73 MG/DL (ref 35–150)
TSH, 3RD GENERATION: 1.2 UIU/ML (ref 0.36–3.74)
URINE CULTURE IF INDICATED: ABNORMAL
UROBILINOGEN UR QL STRIP.AUTO: 0.2 EU/DL (ref 0.2–1)
VLDLC SERPL CALC-MCNC: 14.6 MG/DL (ref 6–23)
WBC # BLD AUTO: 5.8 K/UL (ref 4.3–11.1)
WBC URNS QL MICRO: ABNORMAL /HPF (ref 0–4)

## 2022-12-09 PROCEDURE — 90471 IMMUNIZATION ADMIN: CPT | Performed by: FAMILY MEDICINE

## 2022-12-09 PROCEDURE — 90715 TDAP VACCINE 7 YRS/> IM: CPT | Performed by: FAMILY MEDICINE

## 2022-12-10 LAB
ALBUMIN SERPL-MCNC: 4.5 G/DL (ref 3.5–5)
ALBUMIN/GLOB SERPL: 1.5 {RATIO} (ref 0.4–1.6)
ALP SERPL-CCNC: 104 U/L (ref 50–136)
ALT SERPL-CCNC: 29 U/L (ref 12–65)
ANION GAP SERPL CALC-SCNC: 7 MMOL/L (ref 2–11)
AST SERPL-CCNC: 20 U/L (ref 15–37)
BILIRUB SERPL-MCNC: 0.7 MG/DL (ref 0.2–1.1)
BUN SERPL-MCNC: 17 MG/DL (ref 6–23)
CALCIUM SERPL-MCNC: 9.6 MG/DL (ref 8.3–10.4)
CHLORIDE SERPL-SCNC: 106 MMOL/L (ref 101–110)
CO2 SERPL-SCNC: 27 MMOL/L (ref 21–32)
CREAT SERPL-MCNC: 1 MG/DL (ref 0.8–1.5)
GLOBULIN SER CALC-MCNC: 3.1 G/DL (ref 2.8–4.5)
GLUCOSE SERPL-MCNC: 97 MG/DL (ref 65–100)
POTASSIUM SERPL-SCNC: 4.1 MMOL/L (ref 3.5–5.1)
PROT SERPL-MCNC: 7.6 G/DL (ref 6.3–8.2)
SODIUM SERPL-SCNC: 140 MMOL/L (ref 133–143)

## 2022-12-19 ENCOUNTER — OFFICE VISIT (OUTPATIENT)
Dept: FAMILY MEDICINE CLINIC | Facility: CLINIC | Age: 52
End: 2022-12-19
Payer: COMMERCIAL

## 2022-12-19 VITALS
SYSTOLIC BLOOD PRESSURE: 116 MMHG | WEIGHT: 189 LBS | BODY MASS INDEX: 26.46 KG/M2 | OXYGEN SATURATION: 99 % | TEMPERATURE: 97.4 F | HEART RATE: 84 BPM | DIASTOLIC BLOOD PRESSURE: 84 MMHG | HEIGHT: 71 IN

## 2022-12-19 DIAGNOSIS — J30.1 NON-SEASONAL ALLERGIC RHINITIS DUE TO POLLEN: ICD-10-CM

## 2022-12-19 DIAGNOSIS — Z00.00 ENCOUNTER FOR GENERAL ADULT MEDICAL EXAMINATION WITHOUT ABNORMAL FINDINGS: Primary | ICD-10-CM

## 2022-12-19 PROCEDURE — 99396 PREV VISIT EST AGE 40-64: CPT | Performed by: FAMILY MEDICINE

## 2022-12-19 ASSESSMENT — PATIENT HEALTH QUESTIONNAIRE - PHQ9
1. LITTLE INTEREST OR PLEASURE IN DOING THINGS: 0
SUM OF ALL RESPONSES TO PHQ QUESTIONS 1-9: 0
SUM OF ALL RESPONSES TO PHQ QUESTIONS 1-9: 0
2. FEELING DOWN, DEPRESSED OR HOPELESS: 0
SUM OF ALL RESPONSES TO PHQ QUESTIONS 1-9: 0
SUM OF ALL RESPONSES TO PHQ9 QUESTIONS 1 & 2: 0
SUM OF ALL RESPONSES TO PHQ QUESTIONS 1-9: 0

## 2022-12-22 ASSESSMENT — ENCOUNTER SYMPTOMS
HEARTBURN: 1
RESPIRATORY NEGATIVE: 1
EYES NEGATIVE: 1

## 2023-03-10 ENCOUNTER — NURSE TRIAGE (OUTPATIENT)
Dept: OTHER | Facility: CLINIC | Age: 53
End: 2023-03-10

## 2023-03-10 NOTE — TELEPHONE ENCOUNTER
Location of patient: [facility]    Received call from Jorge Menard at [employer] with [employer]. Subjective: Caller states \"I just want an appt for  - he has been seeing Dr Baljinder Michele for 25 years and they can discuss all this at the appointment\"     Current Symptoms: Left shoulder pain (hx of shoulder problems), no fever, no radiation of pain    Onset: 1 weeks ago; worsening    Associated Symptoms: NA    Pain Severity: 3/10; N/A; none    Temperature: denies       What has been tried: Unknown    LMP: NA Pregnant: NA    Recommended disposition: See in Office Today or Tomorrow    Care advice provided, patient verbalizes understanding; denies any other questions or concerns; instructed to call back for any new or worsening symptoms. Patient/Caller agrees with recommended disposition; writer provided warm transfer to Moira at [facility] for appointment scheduling    Attention Provider: Thank you for allowing me to participate in the care of your patient. The patient was connected to triage in response to information provided to the ECC/PSC. Please do not respond through this encounter as the response is not directed to a shared pool.         Reason for Disposition   Patient wants to be seen    Protocols used: Shoulder Pain-ADULT-[place]

## 2023-03-23 ENCOUNTER — OFFICE VISIT (OUTPATIENT)
Dept: FAMILY MEDICINE CLINIC | Facility: CLINIC | Age: 53
End: 2023-03-23
Payer: COMMERCIAL

## 2023-03-23 VITALS
BODY MASS INDEX: 27.16 KG/M2 | HEIGHT: 71 IN | WEIGHT: 194 LBS | SYSTOLIC BLOOD PRESSURE: 118 MMHG | TEMPERATURE: 97.2 F | HEART RATE: 78 BPM | OXYGEN SATURATION: 97 % | DIASTOLIC BLOOD PRESSURE: 78 MMHG

## 2023-03-23 DIAGNOSIS — M62.838 NECK MUSCLE SPASM: ICD-10-CM

## 2023-03-23 DIAGNOSIS — M25.512 ACUTE PAIN OF LEFT SHOULDER: Primary | ICD-10-CM

## 2023-03-23 PROCEDURE — 96372 THER/PROPH/DIAG INJ SC/IM: CPT | Performed by: FAMILY MEDICINE

## 2023-03-23 PROCEDURE — 99213 OFFICE O/P EST LOW 20 MIN: CPT | Performed by: FAMILY MEDICINE

## 2023-03-23 RX ORDER — METHYLPREDNISOLONE SODIUM SUCCINATE 40 MG/ML
40 INJECTION, POWDER, LYOPHILIZED, FOR SOLUTION INTRAMUSCULAR; INTRAVENOUS ONCE
Status: COMPLETED | OUTPATIENT
Start: 2023-03-23 | End: 2023-03-23

## 2023-03-23 RX ORDER — KETOROLAC TROMETHAMINE 30 MG/ML
60 INJECTION, SOLUTION INTRAMUSCULAR; INTRAVENOUS ONCE
Status: COMPLETED | OUTPATIENT
Start: 2023-03-23 | End: 2023-03-23

## 2023-03-23 RX ADMIN — KETOROLAC TROMETHAMINE 60 MG: 30 INJECTION, SOLUTION INTRAMUSCULAR; INTRAVENOUS at 16:39

## 2023-03-23 RX ADMIN — METHYLPREDNISOLONE SODIUM SUCCINATE 40 MG: 40 INJECTION, POWDER, LYOPHILIZED, FOR SOLUTION INTRAMUSCULAR; INTRAVENOUS at 16:38

## 2023-03-23 SDOH — ECONOMIC STABILITY: FOOD INSECURITY: WITHIN THE PAST 12 MONTHS, YOU WORRIED THAT YOUR FOOD WOULD RUN OUT BEFORE YOU GOT MONEY TO BUY MORE.: NEVER TRUE

## 2023-03-23 SDOH — ECONOMIC STABILITY: HOUSING INSECURITY
IN THE LAST 12 MONTHS, WAS THERE A TIME WHEN YOU DID NOT HAVE A STEADY PLACE TO SLEEP OR SLEPT IN A SHELTER (INCLUDING NOW)?: NO

## 2023-03-23 SDOH — ECONOMIC STABILITY: INCOME INSECURITY: HOW HARD IS IT FOR YOU TO PAY FOR THE VERY BASICS LIKE FOOD, HOUSING, MEDICAL CARE, AND HEATING?: NOT HARD AT ALL

## 2023-03-23 SDOH — ECONOMIC STABILITY: FOOD INSECURITY: WITHIN THE PAST 12 MONTHS, THE FOOD YOU BOUGHT JUST DIDN'T LAST AND YOU DIDN'T HAVE MONEY TO GET MORE.: NEVER TRUE

## 2023-03-23 ASSESSMENT — PATIENT HEALTH QUESTIONNAIRE - PHQ9
SUM OF ALL RESPONSES TO PHQ QUESTIONS 1-9: 0
2. FEELING DOWN, DEPRESSED OR HOPELESS: 0
SUM OF ALL RESPONSES TO PHQ QUESTIONS 1-9: 0
SUM OF ALL RESPONSES TO PHQ QUESTIONS 1-9: 0
1. LITTLE INTEREST OR PLEASURE IN DOING THINGS: 0
SUM OF ALL RESPONSES TO PHQ QUESTIONS 1-9: 0
SUM OF ALL RESPONSES TO PHQ9 QUESTIONS 1 & 2: 0

## 2023-03-23 ASSESSMENT — ENCOUNTER SYMPTOMS
GASTROINTESTINAL NEGATIVE: 1
EYES NEGATIVE: 1
RESPIRATORY NEGATIVE: 1

## 2023-03-23 NOTE — PROGRESS NOTES
Final    Casts 12/09/2022 0-2  0 - 2 /lpf Final    Mucus, UA 12/09/2022 0  0 /lpf Final    Cholesterol, Total 12/09/2022 216 (A)  <200 MG/DL Final    Comment: Borderline High: 200-239 mg/dL  High: Greater than or equal to 240 mg/dL      Triglycerides 12/09/2022 73  35 - 150 MG/DL Final    Comment: Borderline High: 150-199 mg/dL, High: 200-499 mg/dL  Very High: Greater than or equal to 500 mg/dL      HDL 12/09/2022 49  40 - 60 MG/DL Final    LDL Calculated 12/09/2022 152.4 (A)  <100 MG/DL Final    Comment: Near Optimal: 100-129 mg/dL  Borderline High: 130-159, High: 160-189 mg/dL  Very High: Greater than or equal to 190 mg/dL      VLDL Cholesterol Calculated 12/09/2022 14.6  6.0 - 23.0 MG/DL Final    Chol/HDL Ratio 12/09/2022 4.4    Final    Sodium 12/09/2022 140  133 - 143 mmol/L Final    Potassium 12/09/2022 4.1  3.5 - 5.1 mmol/L Final    Chloride 12/09/2022 106  101 - 110 mmol/L Final    CO2 12/09/2022 27  21 - 32 mmol/L Final    Anion Gap 12/09/2022 7  2 - 11 mmol/L Final    Glucose 12/09/2022 97  65 - 100 mg/dL Final    BUN 12/09/2022 17  6 - 23 MG/DL Final    Creatinine 12/09/2022 1.00  0.8 - 1.5 MG/DL Final    Est, Glom Filt Rate 12/09/2022 >60  >60 ml/min/1.73m2 Final    Comment:   Pediatric calculator link: [employer].at. org/professionals/kdoqi/gfr_calculatorped    These results are not intended for use in patients <25years of age. eGFR results are calculated without a race factor using  the 2021 CKD-EPI equation. Careful clinical correlation is recommended, particularly when comparing to results calculated using previous equations. The CKD-EPI equation is less accurate in patients with extremes of muscle mass, extra-renal metabolism of creatinine, excessive creatine ingestion, or following therapy that affects renal tubular secretion.       Calcium 12/09/2022 9.6  8.3 - 10.4 MG/DL Final    Total Bilirubin 12/09/2022 0.7  0.2 - 1.1 MG/DL Final    ALT 12/09/2022 29  12 - 65 U/L Final    AST

## 2023-03-24 ENCOUNTER — TELEPHONE (OUTPATIENT)
Dept: FAMILY MEDICINE CLINIC | Facility: CLINIC | Age: 53
End: 2023-03-24

## 2023-03-24 RX ORDER — CYCLOBENZAPRINE HCL 10 MG
10 TABLET ORAL NIGHTLY PRN
Qty: 10 TABLET | Refills: 0 | Status: SHIPPED | OUTPATIENT
Start: 2023-03-24 | End: 2023-04-03

## 2023-03-24 RX ORDER — METHYLPREDNISOLONE 4 MG/1
TABLET ORAL
Qty: 1 KIT | Refills: 0 | Status: SHIPPED | OUTPATIENT
Start: 2023-03-24 | End: 2023-03-30

## 2023-03-24 NOTE — TELEPHONE ENCOUNTER
Patient was seen yesterday, but meds weren't sent in. Apparently, one of the meds is time sensitive. Please send to [facility].

## 2023-12-29 DIAGNOSIS — Z12.5 SCREENING PSA (PROSTATE SPECIFIC ANTIGEN): ICD-10-CM

## 2023-12-29 DIAGNOSIS — Z00.00 ANNUAL PHYSICAL EXAM: Primary | ICD-10-CM

## 2024-01-03 ENCOUNTER — NURSE ONLY (OUTPATIENT)
Dept: FAMILY MEDICINE CLINIC | Facility: CLINIC | Age: 54
End: 2024-01-03

## 2024-01-03 DIAGNOSIS — Z12.5 SCREENING PSA (PROSTATE SPECIFIC ANTIGEN): ICD-10-CM

## 2024-01-03 DIAGNOSIS — Z00.00 ANNUAL PHYSICAL EXAM: ICD-10-CM

## 2024-01-03 LAB
ALBUMIN SERPL-MCNC: 4.2 G/DL (ref 3.5–5)
ALBUMIN/GLOB SERPL: 1.4 (ref 0.4–1.6)
ALP SERPL-CCNC: 92 U/L (ref 50–136)
ALT SERPL-CCNC: 34 U/L (ref 12–65)
ANION GAP SERPL CALC-SCNC: 4 MMOL/L (ref 2–11)
APPEARANCE UR: NORMAL
AST SERPL-CCNC: 18 U/L (ref 15–37)
BACTERIA URNS QL MICRO: NEGATIVE /HPF
BASOPHILS # BLD: 0 K/UL (ref 0–0.2)
BASOPHILS NFR BLD: 1 % (ref 0–2)
BILIRUB SERPL-MCNC: 0.9 MG/DL (ref 0.2–1.1)
BILIRUB UR QL: NEGATIVE
BUN SERPL-MCNC: 13 MG/DL (ref 6–23)
CALCIUM SERPL-MCNC: 9.3 MG/DL (ref 8.3–10.4)
CASTS URNS QL MICRO: NORMAL /LPF (ref 0–2)
CHLORIDE SERPL-SCNC: 106 MMOL/L (ref 103–113)
CHOLEST SERPL-MCNC: 194 MG/DL
CO2 SERPL-SCNC: 27 MMOL/L (ref 21–32)
COLOR UR: NORMAL
CREAT SERPL-MCNC: 0.9 MG/DL (ref 0.8–1.5)
DIFFERENTIAL METHOD BLD: NORMAL
EOSINOPHIL # BLD: 0.2 K/UL (ref 0–0.8)
EOSINOPHIL NFR BLD: 3 % (ref 0.5–7.8)
EPI CELLS #/AREA URNS HPF: NORMAL /HPF (ref 0–5)
ERYTHROCYTE [DISTWIDTH] IN BLOOD BY AUTOMATED COUNT: 12.3 % (ref 11.9–14.6)
GLOBULIN SER CALC-MCNC: 3.1 G/DL (ref 2.8–4.5)
GLUCOSE SERPL-MCNC: 99 MG/DL (ref 65–100)
GLUCOSE UR STRIP.AUTO-MCNC: NEGATIVE MG/DL
HCT VFR BLD AUTO: 44.1 % (ref 41.1–50.3)
HDLC SERPL-MCNC: 40 MG/DL (ref 40–60)
HDLC SERPL: 4.9
HGB BLD-MCNC: 14.9 G/DL (ref 13.6–17.2)
HGB UR QL STRIP: NEGATIVE
IMM GRANULOCYTES # BLD AUTO: 0 K/UL (ref 0–0.5)
IMM GRANULOCYTES NFR BLD AUTO: 1 % (ref 0–5)
KETONES UR QL STRIP.AUTO: NEGATIVE MG/DL
LDLC SERPL CALC-MCNC: 125.6 MG/DL
LEUKOCYTE ESTERASE UR QL STRIP.AUTO: NEGATIVE
LYMPHOCYTES # BLD: 1.8 K/UL (ref 0.5–4.6)
LYMPHOCYTES NFR BLD: 33 % (ref 13–44)
MCH RBC QN AUTO: 30 PG (ref 26.1–32.9)
MCHC RBC AUTO-ENTMCNC: 33.8 G/DL (ref 31.4–35)
MCV RBC AUTO: 88.7 FL (ref 82–102)
MONOCYTES # BLD: 0.6 K/UL (ref 0.1–1.3)
MONOCYTES NFR BLD: 11 % (ref 4–12)
MUCOUS THREADS URNS QL MICRO: 0 /LPF
NEUTS SEG # BLD: 2.8 K/UL (ref 1.7–8.2)
NEUTS SEG NFR BLD: 51 % (ref 43–78)
NITRITE UR QL STRIP.AUTO: NEGATIVE
NRBC # BLD: 0 K/UL (ref 0–0.2)
PH UR STRIP: 5.5 (ref 5–9)
PLATELET # BLD AUTO: 174 K/UL (ref 150–450)
PMV BLD AUTO: 10.8 FL (ref 9.4–12.3)
POTASSIUM SERPL-SCNC: 4 MMOL/L (ref 3.5–5.1)
PROT SERPL-MCNC: 7.3 G/DL (ref 6.3–8.2)
PROT UR STRIP-MCNC: NEGATIVE MG/DL
PSA SERPL-MCNC: 0.6 NG/ML
RBC # BLD AUTO: 4.97 M/UL (ref 4.23–5.6)
RBC #/AREA URNS HPF: NORMAL /HPF (ref 0–5)
SODIUM SERPL-SCNC: 137 MMOL/L (ref 136–146)
SP GR UR REFRACTOMETRY: 1.02 (ref 1–1.02)
TRIGL SERPL-MCNC: 142 MG/DL (ref 35–150)
TSH, 3RD GENERATION: 1.78 UIU/ML (ref 0.36–3.74)
URINE CULTURE IF INDICATED: NORMAL
UROBILINOGEN UR QL STRIP.AUTO: 0.2 EU/DL (ref 0.2–1)
VLDLC SERPL CALC-MCNC: 28.4 MG/DL (ref 6–23)
WBC # BLD AUTO: 5.4 K/UL (ref 4.3–11.1)
WBC URNS QL MICRO: NORMAL /HPF (ref 0–4)

## 2024-01-06 ASSESSMENT — PATIENT HEALTH QUESTIONNAIRE - PHQ9
SUM OF ALL RESPONSES TO PHQ QUESTIONS 1-9: 0
SUM OF ALL RESPONSES TO PHQ9 QUESTIONS 1 & 2: 0
SUM OF ALL RESPONSES TO PHQ QUESTIONS 1-9: 0
2. FEELING DOWN, DEPRESSED OR HOPELESS: NOT AT ALL
1. LITTLE INTEREST OR PLEASURE IN DOING THINGS: NOT AT ALL
2. FEELING DOWN, DEPRESSED OR HOPELESS: 0
1. LITTLE INTEREST OR PLEASURE IN DOING THINGS: 0
SUM OF ALL RESPONSES TO PHQ9 QUESTIONS 1 & 2: 0

## 2024-01-08 ENCOUNTER — OFFICE VISIT (OUTPATIENT)
Dept: FAMILY MEDICINE CLINIC | Facility: CLINIC | Age: 54
End: 2024-01-08
Payer: COMMERCIAL

## 2024-01-08 VITALS
DIASTOLIC BLOOD PRESSURE: 78 MMHG | HEART RATE: 83 BPM | SYSTOLIC BLOOD PRESSURE: 118 MMHG | BODY MASS INDEX: 27.16 KG/M2 | OXYGEN SATURATION: 97 % | WEIGHT: 194 LBS | TEMPERATURE: 97.9 F | HEIGHT: 71 IN

## 2024-01-08 DIAGNOSIS — K21.00 GASTROESOPHAGEAL REFLUX DISEASE WITH ESOPHAGITIS WITHOUT HEMORRHAGE: ICD-10-CM

## 2024-01-08 DIAGNOSIS — Z00.00 ANNUAL PHYSICAL EXAM: Primary | ICD-10-CM

## 2024-01-08 DIAGNOSIS — J30.1 NON-SEASONAL ALLERGIC RHINITIS DUE TO POLLEN: ICD-10-CM

## 2024-01-08 LAB
HEMOCCULT STL QL: NEGATIVE
VALID INTERNAL CONTROL: YES

## 2024-01-08 PROCEDURE — 99396 PREV VISIT EST AGE 40-64: CPT | Performed by: FAMILY MEDICINE

## 2024-01-08 PROCEDURE — 82272 OCCULT BLD FECES 1-3 TESTS: CPT | Performed by: FAMILY MEDICINE

## 2024-01-16 ASSESSMENT — ENCOUNTER SYMPTOMS
VOMITING: 0
GASTROINTESTINAL NEGATIVE: 1
BACK PAIN: 0
EYES NEGATIVE: 1
RESPIRATORY NEGATIVE: 1
SHORTNESS OF BREATH: 0
WHEEZING: 0
RHINORRHEA: 0
ABDOMINAL DISTENTION: 0
SORE THROAT: 0
EYE REDNESS: 0

## 2024-01-16 NOTE — PROGRESS NOTES
HISTORY OF PRESENT ILLNESS  Michael Ramirez is a 53 y.o. y.o. male    Other  This is a new (physical) problem. The current episode started today. The problem has been unchanged. Pertinent negatives include no arthralgias, chest pain, chills, fatigue, rash, sore throat or vomiting.       No Known Allergies     Current Outpatient Medications   Medication Sig    fexofenadine (ALLEGRA) 180 MG tablet Take by mouth    fluticasone (FLONASE) 50 MCG/ACT nasal spray 1 spray by Nasal route daily    methylPREDNISolone (MEDROL DOSEPACK) 4 MG tablet Take 4 mg by mouth (Patient not taking: Reported on 12/19/2022)    omeprazole (PRILOSEC) 40 MG delayed release capsule Take 40 mg by mouth daily (Patient not taking: Reported on 12/19/2022)    raNITIdine (ZANTAC) 150 MG tablet Take 150 mg by mouth 2 times daily (Patient not taking: Reported on 12/19/2022)     No current facility-administered medications for this visit.        Past Medical History:   Diagnosis Date    Allergic rhinitis, cause unspecified 2/6/2014    Benign neoplasm of colon 2/6/2014    Impotence of organic origin 2/6/2014    Mixed hyperlipidemia 2/6/2014    Other symptoms referable to back 2/6/2014    Other testicular hypofunction 2/6/2014    Pain in joint, ankle and foot 2/6/2014    Reflux esophagitis 2/6/2014    Unspecified disorder of male genital organs 2/6/2014    Unspecified disorder of skin and subcutaneous tissue 2/6/2014    Vitamin D deficiency 4/11/2016        Past Surgical History:   Procedure Laterality Date    SHOULDER ARTHROSCOPY Right 09/2006    SLAP repair    VASECTOMY  2011        Social History     Socioeconomic History    Marital status:      Spouse name: Not on file    Number of children: Not on file    Years of education: Not on file    Highest education level: Not on file   Occupational History    Not on file   Tobacco Use    Smoking status: Never    Smokeless tobacco: Never   Substance and Sexual Activity    Alcohol use: Yes

## 2024-06-14 ENCOUNTER — OFFICE VISIT (OUTPATIENT)
Dept: FAMILY MEDICINE CLINIC | Facility: CLINIC | Age: 54
End: 2024-06-14
Payer: COMMERCIAL

## 2024-06-14 VITALS
HEART RATE: 83 BPM | SYSTOLIC BLOOD PRESSURE: 122 MMHG | OXYGEN SATURATION: 98 % | WEIGHT: 184.6 LBS | TEMPERATURE: 97.5 F | RESPIRATION RATE: 18 BRPM | DIASTOLIC BLOOD PRESSURE: 80 MMHG | BODY MASS INDEX: 26.11 KG/M2

## 2024-06-14 DIAGNOSIS — H65.02 NON-RECURRENT ACUTE SEROUS OTITIS MEDIA OF LEFT EAR: Primary | ICD-10-CM

## 2024-06-14 PROCEDURE — 99213 OFFICE O/P EST LOW 20 MIN: CPT | Performed by: NURSE PRACTITIONER

## 2024-06-14 RX ORDER — METHYLPREDNISOLONE 4 MG/1
TABLET ORAL
Qty: 1 KIT | Refills: 0 | Status: SHIPPED | OUTPATIENT
Start: 2024-06-14 | End: 2024-06-20

## 2024-06-14 RX ORDER — AMOXICILLIN 875 MG/1
875 TABLET, COATED ORAL 2 TIMES DAILY
Qty: 20 TABLET | Refills: 0 | Status: SHIPPED | OUTPATIENT
Start: 2024-06-14 | End: 2024-06-24

## 2024-06-14 SDOH — ECONOMIC STABILITY: INCOME INSECURITY: HOW HARD IS IT FOR YOU TO PAY FOR THE VERY BASICS LIKE FOOD, HOUSING, MEDICAL CARE, AND HEATING?: NOT HARD AT ALL

## 2024-06-14 SDOH — ECONOMIC STABILITY: FOOD INSECURITY: WITHIN THE PAST 12 MONTHS, THE FOOD YOU BOUGHT JUST DIDN'T LAST AND YOU DIDN'T HAVE MONEY TO GET MORE.: NEVER TRUE

## 2024-06-14 SDOH — ECONOMIC STABILITY: FOOD INSECURITY: WITHIN THE PAST 12 MONTHS, YOU WORRIED THAT YOUR FOOD WOULD RUN OUT BEFORE YOU GOT MONEY TO BUY MORE.: NEVER TRUE

## 2024-06-14 SDOH — ECONOMIC STABILITY: TRANSPORTATION INSECURITY
IN THE PAST 12 MONTHS, HAS LACK OF TRANSPORTATION KEPT YOU FROM MEETINGS, WORK, OR FROM GETTING THINGS NEEDED FOR DAILY LIVING?: NO

## 2024-06-14 ASSESSMENT — ENCOUNTER SYMPTOMS
EYE PAIN: 0
BACK PAIN: 0
COUGH: 0
EYE REDNESS: 0
SHORTNESS OF BREATH: 0
VOMITING: 0
DIARRHEA: 0
SORE THROAT: 0
SINUS PAIN: 0
BLOOD IN STOOL: 0
CONSTIPATION: 0
NAUSEA: 0

## 2024-06-14 NOTE — PROGRESS NOTES
nursing note reviewed.   Constitutional:       General: He is not in acute distress.     Appearance: Normal appearance.   HENT:      Head: Normocephalic.      Right Ear: Tympanic membrane, ear canal and external ear normal.      Left Ear: Ear canal and external ear normal. A middle ear effusion is present.      Nose: Nose normal.      Right Sinus: No maxillary sinus tenderness or frontal sinus tenderness.      Left Sinus: No maxillary sinus tenderness or frontal sinus tenderness.      Mouth/Throat:      Lips: Pink.      Mouth: Mucous membranes are moist.      Pharynx: Uvula midline. No pharyngeal swelling, oropharyngeal exudate, posterior oropharyngeal erythema or uvula swelling.      Comments: PND noted  Eyes:      Conjunctiva/sclera: Conjunctivae normal.      Pupils: Pupils are equal, round, and reactive to light.   Cardiovascular:      Rate and Rhythm: Normal rate and regular rhythm.   Pulmonary:      Effort: Pulmonary effort is normal.      Breath sounds: Normal breath sounds. No wheezing, rhonchi or rales.   Musculoskeletal:         General: Normal range of motion.      Cervical back: Normal range of motion.      Right lower leg: No edema.      Left lower leg: No edema.   Lymphadenopathy:      Cervical: No cervical adenopathy.   Skin:     General: Skin is warm and dry.      Capillary Refill: Capillary refill takes less than 2 seconds.      Findings: No rash.   Neurological:      General: No focal deficit present.      Mental Status: He is alert and oriented to person, place, and time. Mental status is at baseline.            On this date 6/14/2024 I have spent 20-29 minutes reviewing previous notes, test results and face to face with the patient discussing the diagnosis and importance of compliance with the treatment plan as well as documenting on the day of the visit.      An electronic signature was used to authenticate this note.    --BERNARD Thomas - CNP

## 2024-06-21 ENCOUNTER — OFFICE VISIT (OUTPATIENT)
Dept: FAMILY MEDICINE CLINIC | Facility: CLINIC | Age: 54
End: 2024-06-21
Payer: COMMERCIAL

## 2024-06-21 VITALS
BODY MASS INDEX: 25.34 KG/M2 | SYSTOLIC BLOOD PRESSURE: 120 MMHG | WEIGHT: 181 LBS | HEIGHT: 71 IN | HEART RATE: 85 BPM | OXYGEN SATURATION: 96 % | DIASTOLIC BLOOD PRESSURE: 76 MMHG | TEMPERATURE: 97.6 F

## 2024-06-21 DIAGNOSIS — J01.90 ACUTE BACTERIAL SINUSITIS: Primary | ICD-10-CM

## 2024-06-21 DIAGNOSIS — B96.89 ACUTE BACTERIAL SINUSITIS: Primary | ICD-10-CM

## 2024-06-21 PROCEDURE — 99213 OFFICE O/P EST LOW 20 MIN: CPT | Performed by: FAMILY MEDICINE

## 2024-06-21 RX ORDER — CEFDINIR 300 MG/1
300 CAPSULE ORAL 2 TIMES DAILY
Qty: 20 CAPSULE | Refills: 0 | Status: SHIPPED | OUTPATIENT
Start: 2024-06-21 | End: 2024-07-01

## 2024-06-21 RX ORDER — METHYLPREDNISOLONE 4 MG/1
TABLET ORAL
Qty: 1 KIT | Refills: 0 | Status: SHIPPED | OUTPATIENT
Start: 2024-06-21 | End: 2024-06-27

## 2024-07-22 NOTE — PROGRESS NOTES
[facility]   Phone: [phone]   Fax: [phone]      Physical Therapy Treatment Note    Date: 2024  Patient Name: Jermaine Burks  : 1948   MRN: [id number]  DOInjury: years  DOSx: NA  Referring Provider: Ashley Rutherford MD  [address] [place] [address]     Medical Diagnosis: S32.030D (ICD-10-CM) - Compression fracture of L3 vertebra with routine healing, subsequent encounter     Outcome Measure:  Oswestry 52%    S: see eval  O:  Time 6234-3230     Visit 1/10 Repeat outcome measure at mid point and end.    Pain 0-910     ROM      Modalities      MH + ES            Exercise      ALL EXERCISE DONE WITH DRAW-IN TECHNIQUE                            Functional activities To aid in reaching , pushing, pulling tasks at home     ROWS: H  \"    ROWS: M  \"    ROWS: L  \"    Obliques - high  \"    Obliques - low  \"     THEREX     Bike      Punches      Lat pulldowns      Triceps ext standing      Marching            Trunk ext TB      Trunk flex TB      Hip abd      Hip EXT      TG Squats                  A:  Tolerated fairly well.  Pt's wife present for session.     P: Continue with rehab plan  Waleska Barnes, PT  PT DPT [id number]    Treatment Charges: Mins Units   Initial Evaluation 32 1   Re-Evaluation     Ther Exercise         TE     Manual Therapy     MT     Ther Activities        TA     Gait Training          GT     Neuro Re-education NR     Modalities     Non-Billable Service Time     Other     Total Time/Units 32 1          HISTORY OF PRESENT ILLNESS  Alejandro Chowdary is a 47 y.o. y.o. male    Other  This is a new (physical) problem. The current episode started today. The problem has been unchanged. Associated symptoms include congestion. Allergic Rhinitis   Presents for follow-up visit. He complains of congestion. The problem occurs every several days. Gastroesophageal Reflux  He complains of heartburn. The problem has been resolved. No Known Allergies     Current Outpatient Medications   Medication Sig    fexofenadine (ALLEGRA) 180 MG tablet Take by mouth    fluticasone (FLONASE) 50 MCG/ACT nasal spray 1 spray by Nasal route daily    methylPREDNISolone (MEDROL DOSEPACK) 4 MG tablet Take 4 mg by mouth (Patient not taking: Reported on 12/19/2022)    omeprazole (PRILOSEC) 40 MG delayed release capsule Take 40 mg by mouth daily (Patient not taking: Reported on 12/19/2022)    raNITIdine (ZANTAC) 150 MG tablet Take 150 mg by mouth 2 times daily (Patient not taking: Reported on 12/19/2022)     No current facility-administered medications for this visit.         Past Medical History:   Diagnosis Date    Allergic rhinitis, cause unspecified 2/6/2014    Benign neoplasm of colon 2/6/2014    Impotence of organic origin 2/6/2014    Mixed hyperlipidemia 2/6/2014    Other symptoms referable to back 2/6/2014    Other testicular hypofunction 2/6/2014    Pain in joint, ankle and foot 2/6/2014    Reflux esophagitis 2/6/2014    Unspecified disorder of male genital organs 2/6/2014    Unspecified disorder of skin and subcutaneous tissue 2/6/2014    Vitamin D deficiency 4/11/2016        Past Surgical History:   Procedure Laterality Date    SHOULDER ARTHROSCOPY Right 09/2006    SLAP repair    VASECTOMY  2011        Social History     Socioeconomic History    Marital status:      Spouse name: Not on file    Number of children: Not on file    Years of education: Not on file    Highest education level: Not on file   Occupational History    Not on file   Tobacco Use    Smoking status: Never    Smokeless tobacco: Never   Substance and Sexual Activity    Alcohol use: Yes     Alcohol/week: 2.5 standard drinks    Drug use: No    Sexual activity: Not on file   Other Topics Concern    Not on file   Social History Narrative    Not on file     Social Determinants of Health     Financial Resource Strain: Not on file   Food Insecurity: Not on file   Transportation Needs: Not on file   Physical Activity: Not on file   Stress: Not on file   Social Connections: Not on file   Intimate Partner Violence: Not on file   Housing Stability: Not on file        Review of Systems   Constitutional: Negative. HENT:  Positive for congestion. Eyes: Negative. Respiratory: Negative. Cardiovascular: Negative. Gastrointestinal:  Positive for heartburn. Endocrine: Negative. Genitourinary: Negative. Skin: Negative. Neurological: Negative. Psychiatric/Behavioral: Negative. /84   Pulse 84   Temp 97.4 °F (36.3 °C) (Temporal)   Ht 5' 10.5\" (1.791 m)   Wt 189 lb (85.7 kg)   SpO2 99%   BMI 26.74 kg/m²      Physical Exam  Constitutional:       General: He is not in acute distress. Appearance: Normal appearance. HENT:      Head: Normocephalic. Nose: Nose normal.   Eyes:      Conjunctiva/sclera: Conjunctivae normal.   Cardiovascular:      Rate and Rhythm: Normal rate. Pulmonary:      Effort: Pulmonary effort is normal.      Breath sounds: Normal breath sounds. Abdominal:      General: Abdomen is flat. Bowel sounds are normal.      Palpations: Abdomen is soft. Musculoskeletal:         General: Normal range of motion. Cervical back: Normal range of motion. Skin:     General: Skin is warm and dry. Neurological:      General: No focal deficit present. Mental Status: He is alert.    Psychiatric:         Mood and Affect: Mood normal.       Nurse Only on 12/09/2022   Component Date Value Ref Range Status    TSH, 3RD GENERATION 12/09/2022 1.200  0.358 - 3.740 uIU/mL Final    PSA 12/09/2022 0.7  <4.0 ng/mL Final    Comment: [employer].   New method in use, please reestablish patient baseline      Color, UA 12/09/2022 YELLOW/STRAW    Final    Color Reference Range: Straw, Yellow or Dark Yellow    Appearance 12/09/2022 CLEAR    Final    Specific Gravity, UA 12/09/2022 1.024 (A)  1.001 - 1.023   Final    pH, Urine 12/09/2022 5.5  5.0 - 9.0   Final    Protein, UA 12/09/2022 Negative  Negative mg/dL Final    Glucose, UA 12/09/2022 Negative  mg/dL Final    Ketones, Urine 12/09/2022 Negative  Negative mg/dL Final    Bilirubin Urine 12/09/2022 Negative  Negative   Final    Blood, Urine 12/09/2022 Negative  Negative   Final    Urobilinogen, Urine 12/09/2022 0.2  0.2 - 1.0 EU/dL Final    Nitrite, Urine 12/09/2022 Negative  Negative   Final    Leukocyte Esterase, Urine 12/09/2022 Negative  Negative   Final    Urine Culture if Indicated 12/09/2022 CULTURE NOT INDICATED BY UA RESULT    Final    WBC, UA 12/09/2022 0-4  0 - 4 /hpf Final    RBC, UA 12/09/2022 0-5  0 - 5 /hpf Final    BACTERIA, URINE 12/09/2022 Negative  Negative /hpf Final    Epithelial Cells UA 12/09/2022 0-5  0 - 5 /hpf Final    Casts 12/09/2022 0-2  0 - 2 /lpf Final    Mucus, UA 12/09/2022 0  0 /lpf Final    Cholesterol, Total 12/09/2022 216 (A)  <200 MG/DL Final    Comment: Borderline High: 200-239 mg/dL  High: Greater than or equal to 240 mg/dL      Triglycerides 12/09/2022 73  35 - 150 MG/DL Final    Comment: Borderline High: 150-199 mg/dL, High: 200-499 mg/dL  Very High: Greater than or equal to 500 mg/dL      HDL 12/09/2022 49  40 - 60 MG/DL Final    LDL Calculated 12/09/2022 152.4 (A)  <100 MG/DL Final    Comment: Near Optimal: 100-129 mg/dL  Borderline High: 130-159, High: 160-189 mg/dL  Very High: Greater than or equal to 190 mg/dL      VLDL Cholesterol Calculated 12/09/2022 14.6  6.0 - 23.0 MG/DL Final    Chol/HDL Ratio 12/09/2022 4.4    Final    Sodium 12/09/2022 Attending Dr: Ashley Rutherford    Ordering Physician: Ashley Rutherford    Date of Service: 07/10/24  Procedure(s): MR lumbar spine wo con  Accession Number(s): [id number]    cc: Ashley Rutherford      INDICATION:  Low back pain, increasing.  History of compression fracture in 2017     TECHNIQUE:  Unenhanced multiplanar, multisequence MR imaging of the lumbar spine.     COMPARISON:  None Available.     FINDINGS:  Normal lumbar alignment is demonstrated.  There is compression fracture  involving the superior and inferior endplate of L1 with approximately 90% height  loss centrally as well as central defects.  There is convexity along the posterior  and superior margin with narrowing of the central canal to 1.1 cm.  There is some  mild associated edema extending into the disc space at T12-L1 and L1-2.  There is  superior endplate concavities of L3 and L4 which appear chronic with approximately  50-60% height loss.  There is mild convexity along the posterior and superior  margin at L3-L4.  There is 5 mm anterolisthesis of L5 on S1.  Conus medullaris is  unremarkable.     Paraspinal soft tissues and visualized portions of the abdomen and pelvis are  unremarkable.  Bilateral renal cysts are demonstrated     At T12-L1, there is mild annular disc bulge with bi-foraminal extension and mild  facet arthrosis.  There is moderate bilateral neural foraminal stenosis.     At L1-2 there is mild annular disc bulge and mild facet arthrosis.  Central canal  is patent.  There is moderate to advanced right and moderate left neural foraminal  stenosis.     At L2-3 there is broad-based disc bulge and mild facet arthrosis.  Central canal is  patent.  There is moderate right and mild left neural foraminal stenosis.     At L3-4 there is mild annular disc bulge and mild facet arthrosis.  Central canal  is patent.  There is moderate to advanced bilateral neural foraminal stenosis.     At L4-5 there is mild annular disc bulge and small right paracentral disc   140  133 - 143 mmol/L Final    Potassium 12/09/2022 4.1  3.5 - 5.1 mmol/L Final    Chloride 12/09/2022 106  101 - 110 mmol/L Final    CO2 12/09/2022 27  21 - 32 mmol/L Final    Anion Gap 12/09/2022 7  2 - 11 mmol/L Final    Glucose 12/09/2022 97  65 - 100 mg/dL Final    BUN 12/09/2022 17  6 - 23 MG/DL Final    Creatinine 12/09/2022 1.00  0.8 - 1.5 MG/DL Final    Est, Glom Filt Rate 12/09/2022 >60  >60 ml/min/1.73m2 Final    Comment:   Pediatric calculator link: [email or web address] org/professionals/kdoqi/gfr_[email or web address]    These results are not intended for use in patients <25years of age. eGFR results are calculated without a race factor using  the 2021 CKD-EPI equation. Careful clinical correlation is recommended, particularly when comparing to results calculated using previous equations. The CKD-EPI equation is less accurate in patients with extremes of muscle mass, extra-renal metabolism of creatinine, excessive creatine ingestion, or following therapy that affects renal tubular secretion.       Calcium 12/09/2022 9.6  8.3 - 10.4 MG/DL Final    Total Bilirubin 12/09/2022 0.7  0.2 - 1.1 MG/DL Final    ALT 12/09/2022 29  12 - 65 U/L Final    AST 12/09/2022 20  15 - 37 U/L Final    Alk Phosphatase 12/09/2022 104  50 - 136 U/L Final    Total Protein 12/09/2022 7.6  6.3 - 8.2 g/dL Final    Albumin 12/09/2022 4.5  3.5 - 5.0 g/dL Final    Globulin 12/09/2022 3.1  2.8 - 4.5 g/dL Final    Albumin/Globulin Ratio 12/09/2022 1.5  0.4 - 1.6   Final    WBC 12/09/2022 5.8  4.3 - 11.1 K/uL Final    RBC 12/09/2022 5.19  4.23 - 5.6 M/uL Final    Hemoglobin 12/09/2022 15.3  13.6 - 17.2 g/dL Final    Hematocrit 12/09/2022 47.3  41.1 - 50.3 % Final    MCV 12/09/2022 91.1  82 - 102 FL Final    MCH 12/09/2022 29.5  26.1 - 32.9 PG Final    MCHC 12/09/2022 32.3  31.4 - 35.0 g/dL Final    RDW 12/09/2022 12.7  11.9 - 14.6 % Final    Platelets 53/85/9289 198  150 - 450 K/uL Final    MPV 12/09/2022 10.4  9.4 - 12.3 FL Final    nRBC 12/09/2022 0.00  0.0 - 0.2 K/uL Final    **Note: Absolute NRBC parameter is now reported with Hemogram**    Differential Type 12/09/2022 AUTOMATED    Final    Seg Neutrophils 12/09/2022 55  43 - 78 % Final    Lymphocytes 12/09/2022 31  13 - 44 % Final    Monocytes 12/09/2022 10  4.0 - 12.0 % Final    Eosinophils % 12/09/2022 2  0.5 - 7.8 % Final    Basophils 12/09/2022 1  0.0 - 2.0 % Final    Immature Granulocytes 12/09/2022 1  0.0 - 5.0 % Final    Segs Absolute 12/09/2022 3.2  1.7 - 8.2 K/UL Final    Absolute Lymph # 12/09/2022 1.8  0.5 - 4.6 K/UL Final    Absolute Mono # 12/09/2022 0.6  0.1 - 1.3 K/UL Final    Absolute Eos # 12/09/2022 0.1  0.0 - 0.8 K/UL Final    Basophils Absolute 12/09/2022 0.0  0.0 - 0.2 K/UL Final    Absolute Immature Granulocyte 12/09/2022 0.0  0.0 - 0.5 K/UL Final       ASSESSMENT and PLAN    Encounter for general adult medical examination without abnormal findings  Non-seasonal allergic rhinitis due to pollen    The patient was seen today for the annual preventive health visit. The patient was provided anticipatory guidance and counseling regarding age / sex appropriate health maintenance issues including vaccinations, blood pressure screening, lipid screening, cancer screenings, diet, exercise, avoidance of tobacco / substance abuse. Current treatment plan is effective. no changes in therapy  reviewed diet, exercise and weight control   Cardiovascular risk and specific lipid/ LDL goals reviewed    No follow-ups on file.      Vivian Barry MD

## 2024-08-01 ENCOUNTER — OFFICE VISIT (OUTPATIENT)
Dept: FAMILY MEDICINE CLINIC | Facility: CLINIC | Age: 54
End: 2024-08-01
Payer: COMMERCIAL

## 2024-08-01 VITALS
HEIGHT: 71 IN | BODY MASS INDEX: 24.5 KG/M2 | TEMPERATURE: 97.3 F | WEIGHT: 175 LBS | SYSTOLIC BLOOD PRESSURE: 122 MMHG | OXYGEN SATURATION: 97 % | DIASTOLIC BLOOD PRESSURE: 80 MMHG | HEART RATE: 90 BPM

## 2024-08-01 DIAGNOSIS — H65.05 RECURRENT ACUTE SEROUS OTITIS MEDIA OF LEFT EAR: Primary | ICD-10-CM

## 2024-08-01 PROCEDURE — 99213 OFFICE O/P EST LOW 20 MIN: CPT | Performed by: FAMILY MEDICINE

## 2024-08-02 ENCOUNTER — TELEPHONE (OUTPATIENT)
Dept: FAMILY MEDICINE CLINIC | Facility: CLINIC | Age: 54
End: 2024-08-02

## 2024-08-02 RX ORDER — METHYLPREDNISOLONE 4 MG/1
TABLET ORAL
Qty: 1 KIT | Refills: 0 | Status: SHIPPED | OUTPATIENT
Start: 2024-08-02 | End: 2024-08-07

## 2024-08-02 NOTE — TELEPHONE ENCOUNTER
Patient was seen yesterday, but his Medrol Dose Pack was not sent to [facility]. They are leaving on vacation tomorrow, and he'd like to start the medication before leaving.

## 2024-08-12 ASSESSMENT — ENCOUNTER SYMPTOMS
GASTROINTESTINAL NEGATIVE: 1
EYES NEGATIVE: 1
RESPIRATORY NEGATIVE: 1

## 2024-08-12 NOTE — PROGRESS NOTES
HISTORY OF PRESENT ILLNESS  Michael Ramirez is a 54 y.o. y.o. male    Ear Fullness   There is pain in the left ear. This is a recurrent problem. The problem has been unchanged. There has been no fever. The patient is experiencing no pain.       No Known Allergies     Current Outpatient Medications   Medication Sig    fexofenadine (ALLEGRA) 180 MG tablet Take by mouth    fluticasone (FLONASE) 50 MCG/ACT nasal spray 1 spray by Nasal route daily     No current facility-administered medications for this visit.        Past Medical History:   Diagnosis Date    Allergic rhinitis, cause unspecified 2/6/2014    Benign neoplasm of colon 2/6/2014    Impotence of organic origin 2/6/2014    Mixed hyperlipidemia 2/6/2014    Other symptoms referable to back 2/6/2014    Other testicular hypofunction 2/6/2014    Pain in joint, ankle and foot 2/6/2014    Reflux esophagitis 2/6/2014    Unspecified disorder of male genital organs 2/6/2014    Unspecified disorder of skin and subcutaneous tissue 2/6/2014    Vitamin D deficiency 4/11/2016        Past Surgical History:   Procedure Laterality Date    SHOULDER ARTHROSCOPY Right 09/2006    SLAP repair    VASECTOMY  2011        Social History     Socioeconomic History    Marital status:      Spouse name: Not on file    Number of children: Not on file    Years of education: Not on file    Highest education level: Not on file   Occupational History    Not on file   Tobacco Use    Smoking status: Never    Smokeless tobacco: Never   Substance and Sexual Activity    Alcohol use: Yes     Alcohol/week: 7.0 standard drinks of alcohol     Types: 7 Cans of beer per week    Drug use: No    Sexual activity: Yes     Partners: Female   Other Topics Concern    Not on file   Social History Narrative    Not on file     Social Determinants of Health     Financial Resource Strain: Low Risk  (8/28/2023)    Received from [facility]    Financial Resource Strain     Difficulty Paying Living Expenses: Not

## 2024-08-30 ENCOUNTER — PATIENT MESSAGE (OUTPATIENT)
Dept: FAMILY MEDICINE CLINIC | Facility: CLINIC | Age: 54
End: 2024-08-30

## 2024-08-30 DIAGNOSIS — H93.8X3 PRESSURE SENSATION IN BOTH EARS: ICD-10-CM

## 2024-08-30 DIAGNOSIS — H93.13 TINNITUS OF BOTH EARS: Primary | ICD-10-CM

## 2025-05-05 ENCOUNTER — APPOINTMENT (OUTPATIENT)
Dept: URBAN - METROPOLITAN AREA CLINIC 329 | Facility: CLINIC | Age: 55
Setting detail: DERMATOLOGY
End: 2025-05-05

## 2025-05-05 DIAGNOSIS — L82.1 OTHER SEBORRHEIC KERATOSIS: ICD-10-CM

## 2025-05-05 DIAGNOSIS — L98.8 OTHER SPECIFIED DISORDERS OF THE SKIN AND SUBCUTANEOUS TISSUE: ICD-10-CM

## 2025-05-05 DIAGNOSIS — D22 MELANOCYTIC NEVI: ICD-10-CM

## 2025-05-05 DIAGNOSIS — L57.8 OTHER SKIN CHANGES DUE TO CHRONIC EXPOSURE TO NONIONIZING RADIATION: ICD-10-CM

## 2025-05-05 DIAGNOSIS — L81.4 OTHER MELANIN HYPERPIGMENTATION: ICD-10-CM

## 2025-05-05 DIAGNOSIS — D18.0 HEMANGIOMA: ICD-10-CM

## 2025-05-05 PROBLEM — D22.5 MELANOCYTIC NEVI OF TRUNK: Status: ACTIVE | Noted: 2025-05-05

## 2025-05-05 PROBLEM — D22.72 MELANOCYTIC NEVI OF LEFT LOWER LIMB, INCLUDING HIP: Status: ACTIVE | Noted: 2025-05-05

## 2025-05-05 PROBLEM — D18.01 HEMANGIOMA OF SKIN AND SUBCUTANEOUS TISSUE: Status: ACTIVE | Noted: 2025-05-05

## 2025-05-05 PROBLEM — D22.71 MELANOCYTIC NEVI OF RIGHT LOWER LIMB, INCLUDING HIP: Status: ACTIVE | Noted: 2025-05-05

## 2025-05-05 PROBLEM — D22.4 MELANOCYTIC NEVI OF SCALP AND NECK: Status: ACTIVE | Noted: 2025-05-05

## 2025-05-05 PROBLEM — D22.61 MELANOCYTIC NEVI OF RIGHT UPPER LIMB, INCLUDING SHOULDER: Status: ACTIVE | Noted: 2025-05-05

## 2025-05-05 PROCEDURE — ? ADDITIONAL NOTES

## 2025-05-05 PROCEDURE — ? FULL BODY SKIN EXAM

## 2025-05-05 PROCEDURE — ? COUNSELING

## 2025-05-05 PROCEDURE — 99203 OFFICE O/P NEW LOW 30 MIN: CPT

## 2025-05-05 PROCEDURE — ? TREATMENT REGIMEN

## 2025-05-05 ASSESSMENT — LOCATION DETAILED DESCRIPTION DERM
LOCATION DETAILED: RIGHT ANTERIOR DISTAL UPPER ARM
LOCATION DETAILED: RIGHT ANTERIOR DISTAL THIGH
LOCATION DETAILED: SUPERIOR THORACIC SPINE
LOCATION DETAILED: LEFT ANTERIOR DISTAL UPPER ARM
LOCATION DETAILED: LEFT INFERIOR UPPER BACK
LOCATION DETAILED: RIGHT PROXIMAL DORSAL FOREARM
LOCATION DETAILED: LEFT CENTRAL MALAR CHEEK
LOCATION DETAILED: RIGHT VENTRAL DISTAL FOREARM
LOCATION DETAILED: LEFT DISTAL POSTERIOR THIGH
LOCATION DETAILED: LEFT RIB CAGE
LOCATION DETAILED: LEFT PROXIMAL POSTERIOR THIGH
LOCATION DETAILED: LEFT SUPERIOR CENTRAL MALAR CHEEK
LOCATION DETAILED: MID-FRONTAL SCALP
LOCATION DETAILED: EPIGASTRIC SKIN
LOCATION DETAILED: RIGHT SUPERIOR CENTRAL MALAR CHEEK
LOCATION DETAILED: LEFT SUPERIOR PARIETAL SCALP
LOCATION DETAILED: RIGHT SUPERIOR PARIETAL SCALP
LOCATION DETAILED: RIGHT CLAVICULAR SKIN
LOCATION DETAILED: RIGHT SUPERIOR MEDIAL UPPER BACK
LOCATION DETAILED: LEFT DISTAL CALF
LOCATION DETAILED: UPPER STERNUM

## 2025-05-05 ASSESSMENT — LOCATION SIMPLE DESCRIPTION DERM
LOCATION SIMPLE: ABDOMEN
LOCATION SIMPLE: LEFT POSTERIOR THIGH
LOCATION SIMPLE: RIGHT FOREARM
LOCATION SIMPLE: LEFT UPPER BACK
LOCATION SIMPLE: LEFT CHEEK
LOCATION SIMPLE: RIGHT UPPER ARM
LOCATION SIMPLE: UPPER BACK
LOCATION SIMPLE: RIGHT CHEEK
LOCATION SIMPLE: ANTERIOR SCALP
LOCATION SIMPLE: RIGHT UPPER BACK
LOCATION SIMPLE: CHEST
LOCATION SIMPLE: RIGHT THIGH
LOCATION SIMPLE: SCALP
LOCATION SIMPLE: RIGHT CLAVICULAR SKIN
LOCATION SIMPLE: LEFT UPPER ARM
LOCATION SIMPLE: LEFT CALF

## 2025-05-05 ASSESSMENT — LOCATION ZONE DERM
LOCATION ZONE: LEG
LOCATION ZONE: SCALP
LOCATION ZONE: ARM
LOCATION ZONE: FACE
LOCATION ZONE: TRUNK

## 2025-05-05 NOTE — PROCEDURE: ADDITIONAL NOTES
Detail Level: Zone
Render Risk Assessment In Note?: no
Additional Notes: Recommended laser resurfacing treatment.\\nAdvised against sclerotherapy in the face and discussed risk of blindness. Recommended going to an ophthalmologist or plastic surgeon.